# Patient Record
Sex: MALE | Race: WHITE | Employment: OTHER | URBAN - METROPOLITAN AREA
[De-identification: names, ages, dates, MRNs, and addresses within clinical notes are randomized per-mention and may not be internally consistent; named-entity substitution may affect disease eponyms.]

---

## 2017-01-01 ENCOUNTER — CLINICAL SUPPORT (OUTPATIENT)
Dept: CARDIOLOGY CLINIC | Age: 82
End: 2017-01-01

## 2017-01-01 DIAGNOSIS — I48.91 ATRIAL FIBRILLATION, UNSPECIFIED TYPE (HCC): ICD-10-CM

## 2017-01-01 DIAGNOSIS — Z79.01 LONG-TERM (CURRENT) USE OF ANTICOAGULANTS: Primary | ICD-10-CM

## 2017-01-01 LAB
INR BLD: 2.3
INR, EXTERNAL: 2.3 (ref 2–3)
PT POC: 27.9 SECONDS
VALID INTERNAL CONTROL?: YES

## 2017-01-12 ENCOUNTER — CLINICAL SUPPORT (OUTPATIENT)
Dept: CARDIOLOGY CLINIC | Age: 82
End: 2017-01-12

## 2017-01-12 DIAGNOSIS — I48.91 ATRIAL FIBRILLATION, UNSPECIFIED TYPE (HCC): ICD-10-CM

## 2017-01-12 DIAGNOSIS — Z79.01 LONG TERM (CURRENT) USE OF ANTICOAGULANTS: Primary | ICD-10-CM

## 2017-01-12 LAB
INR BLD: 2
PT POC: 21.2 SEC
VALID INTERNAL CONTROL?: YES

## 2017-01-14 ENCOUNTER — HOSPITAL ENCOUNTER (EMERGENCY)
Age: 82
Discharge: HOME | End: 2017-01-14
Attending: EMERGENCY MEDICINE
Payer: MEDICARE

## 2017-01-14 VITALS
HEIGHT: 71 IN | HEART RATE: 59 BPM | SYSTOLIC BLOOD PRESSURE: 105 MMHG | WEIGHT: 160 LBS | TEMPERATURE: 97 F | DIASTOLIC BLOOD PRESSURE: 63 MMHG | OXYGEN SATURATION: 96 % | RESPIRATION RATE: 19 BRPM | BODY MASS INDEX: 22.4 KG/M2

## 2017-01-14 DIAGNOSIS — R55 SYNCOPE, UNSPECIFIED SYNCOPE TYPE: Primary | ICD-10-CM

## 2017-01-14 LAB
ANION GAP BLD CALC-SCNC: 7 MMOL/L (ref 5–15)
APPEARANCE UR: CLEAR
APTT PPP: 28.9 SEC (ref 22.1–32.5)
BACTERIA URNS QL MICRO: NEGATIVE /HPF
BASOPHILS # BLD AUTO: 0 K/UL (ref 0–0.1)
BASOPHILS # BLD: 0 % (ref 0–1)
BILIRUB UR QL: NEGATIVE
BUN SERPL-MCNC: 22 MG/DL (ref 6–20)
BUN/CREAT SERPL: 20 (ref 12–20)
CALCIUM SERPL-MCNC: 8.5 MG/DL (ref 8.5–10.1)
CHLORIDE SERPL-SCNC: 105 MMOL/L (ref 97–108)
CK SERPL-CCNC: 74 U/L (ref 39–308)
CO2 SERPL-SCNC: 26 MMOL/L (ref 21–32)
COLOR UR: ABNORMAL
CREAT SERPL-MCNC: 1.11 MG/DL (ref 0.7–1.3)
DIGOXIN SERPL-MCNC: 0.9 NG/ML (ref 0.9–2)
EOSINOPHIL # BLD: 0.1 K/UL (ref 0–0.4)
EOSINOPHIL NFR BLD: 1 % (ref 0–7)
EPITH CASTS URNS QL MICRO: ABNORMAL /LPF
ERYTHROCYTE [DISTWIDTH] IN BLOOD BY AUTOMATED COUNT: 14.5 % (ref 11.5–14.5)
GLUCOSE SERPL-MCNC: 109 MG/DL (ref 65–100)
GLUCOSE UR STRIP.AUTO-MCNC: NEGATIVE MG/DL
HCT VFR BLD AUTO: 40.5 % (ref 36.6–50.3)
HGB BLD-MCNC: 13.1 G/DL (ref 12.1–17)
HGB UR QL STRIP: ABNORMAL
HYALINE CASTS URNS QL MICRO: ABNORMAL /LPF (ref 0–5)
INR PPP: 1.1 (ref 0.9–1.1)
KETONES UR QL STRIP.AUTO: NEGATIVE MG/DL
LEUKOCYTE ESTERASE UR QL STRIP.AUTO: NEGATIVE
LYMPHOCYTES # BLD AUTO: 14 % (ref 12–49)
LYMPHOCYTES # BLD: 1.1 K/UL (ref 0.8–3.5)
MAGNESIUM SERPL-MCNC: 2 MG/DL (ref 1.6–2.4)
MCH RBC QN AUTO: 29.4 PG (ref 26–34)
MCHC RBC AUTO-ENTMCNC: 32.3 G/DL (ref 30–36.5)
MCV RBC AUTO: 91 FL (ref 80–99)
MONOCYTES # BLD: 0.6 K/UL (ref 0–1)
MONOCYTES NFR BLD AUTO: 7 % (ref 5–13)
NEUTS SEG # BLD: 6 K/UL (ref 1.8–8)
NEUTS SEG NFR BLD AUTO: 78 % (ref 32–75)
NITRITE UR QL STRIP.AUTO: NEGATIVE
PH UR STRIP: 5.5 [PH] (ref 5–8)
PLATELET # BLD AUTO: 227 K/UL (ref 150–400)
POTASSIUM SERPL-SCNC: 4.2 MMOL/L (ref 3.5–5.1)
PROT UR STRIP-MCNC: NEGATIVE MG/DL
PROTHROMBIN TIME: 11.6 SEC (ref 9–11.1)
RBC # BLD AUTO: 4.45 M/UL (ref 4.1–5.7)
RBC #/AREA URNS HPF: ABNORMAL /HPF (ref 0–5)
SODIUM SERPL-SCNC: 138 MMOL/L (ref 136–145)
SP GR UR REFRACTOMETRY: 1.02 (ref 1–1.03)
THERAPEUTIC RANGE,PTTT: NORMAL SECS (ref 58–77)
TROPONIN I SERPL-MCNC: <0.04 NG/ML
UA: UC IF INDICATED,UAUC: ABNORMAL
UROBILINOGEN UR QL STRIP.AUTO: 0.2 EU/DL (ref 0.2–1)
WBC # BLD AUTO: 7.8 K/UL (ref 4.1–11.1)
WBC URNS QL MICRO: ABNORMAL /HPF (ref 0–4)

## 2017-01-14 PROCEDURE — 84484 ASSAY OF TROPONIN QUANT: CPT | Performed by: EMERGENCY MEDICINE

## 2017-01-14 PROCEDURE — 94762 N-INVAS EAR/PLS OXIMTRY CONT: CPT

## 2017-01-14 PROCEDURE — 96361 HYDRATE IV INFUSION ADD-ON: CPT

## 2017-01-14 PROCEDURE — 93005 ELECTROCARDIOGRAM TRACING: CPT

## 2017-01-14 PROCEDURE — 74011250636 HC RX REV CODE- 250/636: Performed by: EMERGENCY MEDICINE

## 2017-01-14 PROCEDURE — 80048 BASIC METABOLIC PNL TOTAL CA: CPT | Performed by: EMERGENCY MEDICINE

## 2017-01-14 PROCEDURE — 81001 URINALYSIS AUTO W/SCOPE: CPT | Performed by: EMERGENCY MEDICINE

## 2017-01-14 PROCEDURE — 36415 COLL VENOUS BLD VENIPUNCTURE: CPT | Performed by: EMERGENCY MEDICINE

## 2017-01-14 PROCEDURE — 82550 ASSAY OF CK (CPK): CPT | Performed by: EMERGENCY MEDICINE

## 2017-01-14 PROCEDURE — 80162 ASSAY OF DIGOXIN TOTAL: CPT | Performed by: EMERGENCY MEDICINE

## 2017-01-14 PROCEDURE — 85730 THROMBOPLASTIN TIME PARTIAL: CPT | Performed by: EMERGENCY MEDICINE

## 2017-01-14 PROCEDURE — 99285 EMERGENCY DEPT VISIT HI MDM: CPT

## 2017-01-14 PROCEDURE — 83735 ASSAY OF MAGNESIUM: CPT | Performed by: EMERGENCY MEDICINE

## 2017-01-14 PROCEDURE — 85025 COMPLETE CBC W/AUTO DIFF WBC: CPT | Performed by: EMERGENCY MEDICINE

## 2017-01-14 PROCEDURE — 85610 PROTHROMBIN TIME: CPT | Performed by: EMERGENCY MEDICINE

## 2017-01-14 PROCEDURE — 96360 HYDRATION IV INFUSION INIT: CPT

## 2017-01-14 RX ADMIN — SODIUM CHLORIDE 1000 ML: 900 INJECTION, SOLUTION INTRAVENOUS at 12:28

## 2017-01-14 NOTE — ED TRIAGE NOTES
Pt reports he was sitting getting his hair cut this morning, pt became dizzy and had a syncopal episode, pt denies LOC, wife reports + LOC, pt having hip pain from the fall

## 2017-01-14 NOTE — ED NOTES
I have reviewed discharge instructions. Paperwork given by provider and reviewed with patient; opportunity for questions given. Pt confirmed understanding of discharge instructions and of need for follow up with primary care provider. Pt left walking . Pt left with all personal belongings. Pt family/friends are present. Pt states he/she is not driving. Pt is alert and oriented x 4. Pt is medically stable and is in no current distress. Chief complaint was addressed and has improved.

## 2017-01-14 NOTE — DISCHARGE INSTRUCTIONS
We hope that we have addressed all of your medical concerns. The examination and treatment you received in the Emergency Department were for an emergent problem and were not intended as complete care. It is important that you follow up with your healthcare provider(s) for ongoing care. If your symptoms worsen or do not improve as expected, and you are unable to reach your usual health care provider(s), you should return to the Emergency Department. Today's healthcare is undergoing tremendous change, and patient satisfaction surveys are one of the many tools to assess the quality of medical care. You may receive a survey from the [employer] regarding your experience in the Emergency Department. I hope that your experience has been completely positive, particularly the medical care that I provided. As such, please participate in the survey; anything less than excellent does not meet my expectations or intentions. [facility] and [facility] participate in nationally recognized quality of care measures. If your blood pressure is greater than 120/80, as reported below, we urge that you seek medical care to address the potential of high blood pressure, commonly known as hypertension. Hypertension can be hereditary or can be caused by certain medical conditions, pain, stress, or \"white coat syndrome. \"       Please make an appointment with your health care provider(s) for follow up of your Emergency Department visit. VITALS:   Patient Vitals for the past 8 hrs:   Temp Pulse Resp BP SpO2   01/14/17 1300 - (!) 57 19 123/78 -   01/14/17 1230 - 66 20 140/89 99 %   01/14/17 1215 - 61 19 (!) 154/96 100 %   01/14/17 1207 97 °F (36.1 °C) 63 - (!) 144/94 100 %          Thank you for allowing us to provide you with medical care today. We realize that you have many choices for your emergency care needs.   Please choose us in the future for any continued health care needs. Mayra Zavaleta MD    [address] [facility].   Office: [phone]            Recent Results (from the past 24 hour(s))   EKG, 12 LEAD, INITIAL    Collection Time: 01/14/17 12:11 PM   Result Value Ref Range    Ventricular Rate 82 BPM    Atrial Rate 70 BPM    QRS Duration 88 ms    Q-T Interval 412 ms    QTC Calculation (Bezet) 481 ms    Calculated R Axis -41 degrees    Calculated T Axis -103 degrees    Diagnosis       Accelerated Junctional rhythm with frequent premature ventricular complexes  Left axis deviation  Nonspecific ST and T wave abnormality  Prolonged QT  Abnormal ECG  No previous ECGs available     CBC WITH AUTOMATED DIFF    Collection Time: 01/14/17 12:22 PM   Result Value Ref Range    WBC 7.8 4.1 - 11.1 K/uL    RBC 4.45 4.10 - 5.70 M/uL    HGB 13.1 12.1 - 17.0 g/dL    HCT 40.5 36.6 - 50.3 %    MCV 91.0 80.0 - 99.0 FL    MCH 29.4 26.0 - 34.0 PG    MCHC 32.3 30.0 - 36.5 g/dL    RDW 14.5 11.5 - 14.5 %    PLATELET 645 240 - 678 K/uL    NEUTROPHILS 78 (H) 32 - 75 %    LYMPHOCYTES 14 12 - 49 %    MONOCYTES 7 5 - 13 %    EOSINOPHILS 1 0 - 7 %    BASOPHILS 0 0 - 1 %    ABS. NEUTROPHILS 6.0 1.8 - 8.0 K/UL    ABS. LYMPHOCYTES 1.1 0.8 - 3.5 K/UL    ABS. MONOCYTES 0.6 0.0 - 1.0 K/UL    ABS. EOSINOPHILS 0.1 0.0 - 0.4 K/UL    ABS.  BASOPHILS 0.0 0.0 - 0.1 K/UL   METABOLIC PANEL, BASIC    Collection Time: 01/14/17 12:22 PM   Result Value Ref Range    Sodium 138 136 - 145 mmol/L    Potassium 4.2 3.5 - 5.1 mmol/L    Chloride 105 97 - 108 mmol/L    CO2 26 21 - 32 mmol/L    Anion gap 7 5 - 15 mmol/L    Glucose 109 (H) 65 - 100 mg/dL    BUN 22 (H) 6 - 20 MG/DL    Creatinine 1.11 0.70 - 1.30 MG/DL    BUN/Creatinine ratio 20 12 - 20      GFR est AA >60 >60 ml/min/1.73m2    GFR est non-AA >60 >60 ml/min/1.73m2    Calcium 8.5 8.5 - 10.1 MG/DL   CK W/ REFLX CKMB    Collection Time: 01/14/17 12:22 PM   Result Value Ref Range    CK 74 39 - 308 U/L   TROPONIN I Collection Time: 01/14/17 12:22 PM   Result Value Ref Range    Troponin-I, Qt. <0.04 <0.05 ng/mL   URINALYSIS W/ REFLEX CULTURE    Collection Time: 01/14/17 12:22 PM   Result Value Ref Range    Color YELLOW/STRAW      Appearance CLEAR CLEAR      Specific gravity 1.022 1.003 - 1.030      pH (UA) 5.5 5.0 - 8.0      Protein NEGATIVE  NEG mg/dL    Glucose NEGATIVE  NEG mg/dL    Ketone NEGATIVE  NEG mg/dL    Bilirubin NEGATIVE  NEG      Blood LARGE (A) NEG      Urobilinogen 0.2 0.2 - 1.0 EU/dL    Nitrites NEGATIVE  NEG      Leukocyte Esterase NEGATIVE  NEG      WBC 0-4 0 - 4 /hpf    RBC 10-20 0 - 5 /hpf    Epithelial cells FEW FEW /lpf    Bacteria NEGATIVE  NEG /hpf    UA:UC IF INDICATED CULTURE NOT INDICATED BY UA RESULT CNI      Hyaline Cast 2-5 0 - 5 /lpf   MAGNESIUM    Collection Time: 01/14/17 12:22 PM   Result Value Ref Range    Magnesium 2.0 1.6 - 2.4 mg/dL   PROTHROMBIN TIME + INR    Collection Time: 01/14/17 12:22 PM   Result Value Ref Range    INR 1.1 0.9 - 1.1      Prothrombin time 11.6 (H) 9.0 - 11.1 sec   PTT    Collection Time: 01/14/17 12:22 PM   Result Value Ref Range    aPTT 28.9 22.1 - 32.5 sec    aPTT, therapeutic range     58.0 - 77.0 SECS   DIGOXIN    Collection Time: 01/14/17 12:22 PM   Result Value Ref Range    DIGOXIN 0.9 0.90 - 2.00 NG/ML   EKG, 12 LEAD, INITIAL    Collection Time: 01/14/17  1:14 PM   Result Value Ref Range    Ventricular Rate 55 BPM    Atrial Rate 55 BPM    P-R Interval 216 ms    QRS Duration 94 ms    Q-T Interval 426 ms    QTC Calculation (Bezet) 407 ms    Calculated P Axis 1 degrees    Calculated R Axis -35 degrees    Calculated T Axis 8 degrees    Diagnosis       Sinus bradycardia with 1st degree AV block  Left axis deviation  Abnormal ECG  When compared with ECG of 14-JAN-2017 12:11,  MANUAL COMPARISON REQUIRED, DATA IS UNCONFIRMED         No results found.          Fainting: Care Instructions  Your Care Instructions    When you faint, or pass out, you lose consciousness for a short time. A brief drop in blood flow to the brain often causes it. When you fall or lie down, more blood flows to your brain and you regain consciousness. Emotional stress, pain, or overheating--especially if you have been standing--can make you faint. In these cases, fainting is usually not serious. But fainting can be a sign of a more serious problem. Your doctor may want you to have more tests to rule out other causes. The treatment you need depends on the reason why you fainted. The doctor has checked you carefully, but problems can develop later. If you notice any problems or new symptoms, get medical treatment right away. Follow-up care is a key part of your treatment and safety. Be sure to make and go to all appointments, and call your doctor if you are having problems. It's also a good idea to know your test results and keep a list of the medicines you take. How can you care for yourself at home? · Drink plenty of fluids to prevent dehydration. If you have kidney, heart, or liver disease and have to limit fluids, talk with your doctor before you increase your fluid intake. When should you call for help? Call 911 anytime you think you may need emergency care. For example, call if:  · You have symptoms of a heart problem. These may include:  ¨ Chest pain or pressure. ¨ Severe trouble breathing. ¨ A fast or irregular heartbeat. ¨ Lightheadedness or sudden weakness. ¨ Coughing up pink, foamy mucus. ¨ Passing out. After you call 911, the  may tell you to chew 1 adult-strength or 2 to 4 low-dose aspirin. Wait for an ambulance. Do not try to drive yourself. · You have symptoms of a stroke. These may include:  ¨ Sudden numbness, tingling, weakness, or loss of movement in your face, arm, or leg, especially on only one side of your body. ¨ Sudden vision changes. ¨ Sudden trouble speaking. ¨ Sudden confusion or trouble understanding simple statements.   ¨ Sudden problems with walking or balance. ¨ A sudden, severe headache that is different from past headaches. · You passed out (lost consciousness) again. Watch closely for changes in your health, and be sure to contact your doctor if:  · You do not get better as expected. Where can you learn more? Go to http://maite-miguel.info/. Enter P333 in the search box to learn more about \"Fainting: Care Instructions. \"  Current as of: May 27, 2016  Content Version: 11.1  © 3920-7419 [employer]. Care instructions adapted under license by [employer] (which disclaims liability or warranty for this information). If you have questions about a medical condition or this instruction, always ask your healthcare professional. [address] any warranty or liability for your use of this information.

## 2017-01-14 NOTE — ED PROVIDER NOTES
HPI Comments: 80 y.o. male with past medical history significant for A-fib, mitral valve disorder, GI AVM, and paresthesia who presents from home with chief complaint of syncope. Pt reports he was sitting on a stool having his hair trimmed when he \"became very dizzy\" and per Pt's wife, had a syncopal episode earlier this morning. Pt c/o 3/10 R hip pain. Pt denies previous hx of MI. Pt denies SOB, nausea, and vomiting. There are no other acute medical concerns at this time. Old Chart Review: Pt has PAF, takes digoxin and coumadin, and sees Dr. Shay Leyva for cardiology. EKG from 2 months ago shows normal sinus rhythm. PCP: Kelly Diaz MD    Note written by Harry Sotelo, as dictated by Martha Clifford MD 12:27 PM    The history is provided by the patient and the spouse. Past Medical History:   Diagnosis Date    Atrial fibrillation ([address]     Decreased hearing 8/28/2013    Disorder of skin 8/28/2013    GI AVM (gastrointestinal arteriovenous vascular malformation) 2005     Dr. Addis Sanchez Long-term (current) use of anticoagulants 8/28/2013    Mitral valve disorders     Mixed hyperlipidemia     Nonspecific (abnormal) findings on radiological and other examination of other intrathoracic organs 8/28/2013     Lung    Orthostatic hypotension     ELIANE (obstructive sleep apnea) 2010     Dr. Kimberlyn Lopez Osteopenia 8/28/2013    Paresthesia 8/28/2013    Personal history of malignant melanoma of skin 8/28/2013    Skin lesion 8/28/2013       History reviewed. No pertinent past surgical history. History reviewed. No pertinent family history. Social History     Social History    Marital status:      Spouse name: N/A    Number of children: N/A    Years of education: N/A     Occupational History    Not on file.      Social History Main Topics    Smoking status: Never Smoker    Smokeless tobacco: Not on file    Alcohol use No    Drug use: Not on file    Sexual activity: Not on file     Other Topics Concern    Not on file     Social History Narrative         ALLERGIES: Cortisone and Plavix [clopidogrel]    Review of Systems   Constitutional: Negative for activity change and fever. HENT: Negative for congestion. Eyes: Negative for pain. Respiratory: Negative for cough and shortness of breath. Cardiovascular: Negative for chest pain and leg swelling. Gastrointestinal: Negative for abdominal pain. Endocrine: Negative for polyuria. Genitourinary: Negative for flank pain and hematuria. Musculoskeletal: Positive for arthralgias. Negative for gait problem, neck pain and neck stiffness. Skin: Negative for color change. Allergic/Immunologic: Negative for immunocompromised state. Neurological: Positive for syncope. Negative for speech difficulty and headaches. Hematological: Does not bruise/bleed easily. Psychiatric/Behavioral: Negative for confusion. All other systems reviewed and are negative. Vitals:    01/14/17 1207   BP: (!) 144/94   Pulse: 63   Temp: 97 °F (36.1 °C)   SpO2: 100%   Weight: 72.6 kg (160 lb)   Height: 5' 11\" (1.803 m)            Physical Exam   Constitutional: He is oriented to person, place, and time. He appears well-developed and well-nourished. No distress. Elderly, frail appearing. In no distress. HENT:   Head: Normocephalic and atraumatic. Right Ear: External ear normal.   Left Ear: External ear normal.   Eyes: EOM are normal. Pupils are equal, round, and reactive to light. Neck: Normal range of motion. Neck supple. No JVD present. No tracheal deviation present. Cardiovascular: Normal rate, regular rhythm and normal heart sounds. Exam reveals no gallop and no friction rub. No murmur heard. Pulmonary/Chest: Effort normal and breath sounds normal. No stridor. No respiratory distress. He has no wheezes. He has no rales. Abdominal: Soft. Bowel sounds are normal. He exhibits no distension. There is no tenderness.  There is no rebound and no guarding. Musculoskeletal: Normal range of motion. He exhibits no edema or tenderness. No pain with ROM or movement of bilateral hips. Neurological: He is alert and oriented to person, place, and time. He has normal reflexes. No cranial nerve deficit. Coordination normal.   5/5 hand  bilaterally. 5/5 plantar flexion of feet bilaterally. Skin: Skin is warm and dry. No rash noted. He is not diaphoretic. No erythema. Psychiatric: He has a normal mood and affect. His behavior is normal. Judgment and thought content normal.   Nursing note and vitals reviewed. Note written by Harry Pearson, as dictated by Ramona Smith MD 12:27 PM    MDM  Number of Diagnoses or Management Options  Diagnosis management comments: 80-year-old white male returns to the emergency department with syncope. Patient was feeling fine earlier today. He was getting his hair cut and felt very dizzy. He then passed out. Patient is now feeling fine. He has a history of atrial fibrillation and is on digoxin. We'll check basic blood work. We'll give IV fluids. We'll reassess when testing his back. Patient agrees with this plan.        Amount and/or Complexity of Data Reviewed  Clinical lab tests: ordered and reviewed  Tests in the radiology section of CPT®: ordered and reviewed  Tests in the medicine section of CPT®: ordered and reviewed  Discussion of test results with the performing providers: yes  Decide to obtain previous medical records or to obtain history from someone other than the patient: yes  Obtain history from someone other than the patient: yes  Review and summarize past medical records: yes  Discuss the patient with other providers: yes  Independent visualization of images, tracings, or specimens: yes    Risk of Complications, Morbidity, and/or Mortality  Presenting problems: high  Diagnostic procedures: high  Management options: high      ED Course       Procedures    ED EKG interpretation:  Rhythm: likely A-fib; Rate (approx.): 82; Axis: left axis deviation; ST/T wave: no ST elevation or depressions; very wavy baseline, poor quality EKG. Note written by Harry Sotelo, as dictated by Martha Clifford MD 12:18 PM    PROGRESS NOTE:  12:27 PM  P waves are present before QRS on cardiac monitor. Rhythm appears to be sinus. ED EKG interpretation:  Rhythm: sinus bradycardia with 1st degree AV block; Rate: 55; Axis: left axis deviation; ST/T wave: no ST elevations or depressions; Note written by Harry Sotelo, as dictated by Martha Clifford MD 1:19 PM    CONSULT NOTE:  1:34 PM Martha Clifford MD spoke with Dr. Shay Leyva, Consult for Cardiology. Discussed available diagnostic tests and clinical findings. He is in agreement with care plans as outlined. Dr. Shay Leyva recommends that Pt does not require admission. PROGRESS NOTE:  1:35 PM  Disc w/ Pt about admission. He agrees and does not want to be admitted. Pt feels great. He agrees w/ discharge. Good return precautions given to patient. Close follow up with PCP recommended. Patient and/or family voices understanding of this plan. Discharge instructions were explained by me and all concerns were addressed.

## 2017-01-16 LAB
ATRIAL RATE: 55 BPM
ATRIAL RATE: 70 BPM
CALCULATED P AXIS, ECG09: 1 DEGREES
CALCULATED R AXIS, ECG10: -35 DEGREES
CALCULATED R AXIS, ECG10: -41 DEGREES
CALCULATED T AXIS, ECG11: -103 DEGREES
CALCULATED T AXIS, ECG11: 8 DEGREES
DIAGNOSIS, 93000: NORMAL
DIAGNOSIS, 93000: NORMAL
P-R INTERVAL, ECG05: 216 MS
Q-T INTERVAL, ECG07: 412 MS
Q-T INTERVAL, ECG07: 426 MS
QRS DURATION, ECG06: 88 MS
QRS DURATION, ECG06: 94 MS
QTC CALCULATION (BEZET), ECG08: 407 MS
QTC CALCULATION (BEZET), ECG08: 481 MS
VENTRICULAR RATE, ECG03: 55 BPM
VENTRICULAR RATE, ECG03: 82 BPM

## 2017-01-19 ENCOUNTER — TELEPHONE (OUTPATIENT)
Dept: CARDIOLOGY CLINIC | Age: 82
End: 2017-01-19

## 2017-01-19 NOTE — TELEPHONE ENCOUNTER
The patient requested a call back to discuss a possible hospital follow up. He was unsure if he really needed to be seen. He can be reached on [phone]. Thanks!

## 2017-02-17 ENCOUNTER — CLINICAL SUPPORT (OUTPATIENT)
Dept: CARDIOLOGY CLINIC | Age: 82
End: 2017-02-17

## 2017-02-17 DIAGNOSIS — I48.91 ATRIAL FIBRILLATION, UNSPECIFIED TYPE (HCC): ICD-10-CM

## 2017-02-17 DIAGNOSIS — Z79.01 LONG TERM (CURRENT) USE OF ANTICOAGULANTS: Primary | ICD-10-CM

## 2017-02-17 LAB
INR BLD: 2.5
PT POC: 30.1 SEC
VALID INTERNAL CONTROL?: YES

## 2017-03-17 ENCOUNTER — CLINICAL SUPPORT (OUTPATIENT)
Dept: CARDIOLOGY CLINIC | Age: 82
End: 2017-03-17

## 2017-03-17 DIAGNOSIS — Z79.01 LONG TERM (CURRENT) USE OF ANTICOAGULANTS: Primary | ICD-10-CM

## 2017-03-17 DIAGNOSIS — I48.91 ATRIAL FIBRILLATION, UNSPECIFIED TYPE (HCC): ICD-10-CM

## 2017-03-17 LAB
INR BLD: 2.2
PT POC: 26.7 SEC
VALID INTERNAL CONTROL?: YES

## 2017-04-14 ENCOUNTER — CLINICAL SUPPORT (OUTPATIENT)
Dept: CARDIOLOGY CLINIC | Age: 82
End: 2017-04-14

## 2017-04-14 DIAGNOSIS — I48.91 ATRIAL FIBRILLATION, UNSPECIFIED TYPE (HCC): ICD-10-CM

## 2017-04-14 DIAGNOSIS — Z79.01 LONG TERM (CURRENT) USE OF ANTICOAGULANTS: Primary | ICD-10-CM

## 2017-04-14 LAB
INR BLD: 2
PT POC: 22.3 SEC
VALID INTERNAL CONTROL?: YES

## 2017-05-16 ENCOUNTER — CLINICAL SUPPORT (OUTPATIENT)
Dept: CARDIOLOGY CLINIC | Age: 82
End: 2017-05-16

## 2017-05-16 ENCOUNTER — OFFICE VISIT (OUTPATIENT)
Dept: CARDIOLOGY CLINIC | Age: 82
End: 2017-05-16

## 2017-05-16 VITALS
WEIGHT: 160 LBS | RESPIRATION RATE: 20 BRPM | DIASTOLIC BLOOD PRESSURE: 84 MMHG | HEIGHT: 71 IN | HEART RATE: 59 BPM | BODY MASS INDEX: 22.4 KG/M2 | OXYGEN SATURATION: 97 % | SYSTOLIC BLOOD PRESSURE: 128 MMHG

## 2017-05-16 DIAGNOSIS — I48.91 ATRIAL FIBRILLATION, UNSPECIFIED TYPE (HCC): Primary | ICD-10-CM

## 2017-05-16 DIAGNOSIS — E78.5 DYSLIPIDEMIA: ICD-10-CM

## 2017-05-16 DIAGNOSIS — Z79.01 LONG TERM (CURRENT) USE OF ANTICOAGULANTS: Primary | ICD-10-CM

## 2017-05-16 DIAGNOSIS — I48.91 ATRIAL FIBRILLATION, UNSPECIFIED TYPE (HCC): ICD-10-CM

## 2017-05-16 LAB
INR BLD: 2.5
INR, EXTERNAL: 2.5 (ref 2–3)
PT POC: 30.2 SEC
VALID INTERNAL CONTROL?: YES

## 2017-05-16 NOTE — PROGRESS NOTES
LAST OFFICE VISIT : 5/16/2017      No diagnosis found. Rukhsana Olivo is a 80 y.o. male with hypertension, hyperlipidemia, atrial fibrillation, orthostatic hypotension and mitral regurgitation referred for follow up. Cardiac risk factors: dyslipidemia, sedentary life style, male gender, hypertension. I have personally obtained the history from the patient. HISTORY OF PRESENTING ILLNESS      He reports becoming more easily fatigued with activity than in the past. He sleeps well at night. He denies any episodes of irregular heart rhythm although he does note occasional elevated heart beats when he is active. The patient denies chest pain/ shortness of breath, orthopnea, PND, LE edema, syncope, presyncope or fatigue.        ACTIVE PROBLEM LIST     Patient Active Problem List    Diagnosis Date Noted    Dyslipidemia 11/11/2015    Disorder of skin 08/28/2013    Skin lesion 08/28/2013    Decreased hearing 08/28/2013    Paresthesia 08/28/2013    Osteopenia 08/28/2013    Nonspecific (abnormal) findings on radiological and other examination of other intrathoracic organs 08/28/2013    Personal history of malignant melanoma of skin 08/28/2013    Long-term (current) use of anticoagulants 08/28/2013    Orthostatic hypotension 08/16/2012    A-fib ([address] 02/17/2012    Mitral valve disorder 02/17/2012           PAST MEDICAL HISTORY     Past Medical History:   Diagnosis Date    Atrial fibrillation ([address]     Decreased hearing 8/28/2013    Disorder of skin 8/28/2013    GI AVM (gastrointestinal arteriovenous vascular malformation) 2005    Dr. Tyler Schmidt Long-term (current) use of anticoagulants 8/28/2013    Mitral valve disorders     Mixed hyperlipidemia     Nonspecific (abnormal) findings on radiological and other examination of other intrathoracic organs 8/28/2013    Lung    Orthostatic hypotension     ELIANE (obstructive sleep apnea) 2010    Dr. Viktoria Ibrahim Osteopenia 8/28/2013    Paresthesia 8/28/2013    Personal history of malignant melanoma of skin 8/28/2013    Skin lesion 8/28/2013           PAST SURGICAL HISTORY     History reviewed. No pertinent surgical history. ALLERGIES     Allergies   Allergen Reactions    Cortisone Hives    Plavix [Clopidogrel] Unknown (comments)          FAMILY HISTORY     History reviewed. No pertinent family history. negative for cardiac disease       SOCIAL HISTORY     Social History     Social History    Marital status:      Spouse name: N/A    Number of children: N/A    Years of education: N/A     Social History Main Topics    Smoking status: Never Smoker    Smokeless tobacco: None    Alcohol use No    Drug use: None    Sexual activity: Not Asked     Other Topics Concern    None     Social History Narrative         MEDICATIONS     Current Outpatient Prescriptions   Medication Sig    cholecalciferol (VITAMIN D3) 1,000 unit tablet Take 1,000 Units by mouth Before breakfast, lunch, and dinner.  finasteride (PROSCAR) 5 mg tablet Take 5 mg by mouth daily.  Ferrous Fumarate 325 mg (106 mg iron) tab Take  by mouth.  warfarin (COUMADIN) 5 mg tablet Take 1 Tab by mouth daily. Or as directed by [facility]    digoxin (LANOXIN) 0.125 mg tablet Take 1 Tab by mouth daily.  metoprolol (LOPRESSOR) 25 mg tablet Take 12.5 mg by mouth two (2) times a day.  nitroglycerin (NITROSTAT) 0.4 mg SL tablet 1 Tab by SubLINGual route every five (5) minutes as needed for Chest Pain. No current facility-administered medications for this visit. I have reviewed the nurses notes, vitals, problem list, allergy list, medical history, family, social history and medications. REVIEW OF SYMPTOMS      General: Pt denies excessive weight gain or loss. Pt is able to conduct ADL's  HEENT: Denies blurred vision, headaches, hearing loss, epistaxis and difficulty swallowing.   Respiratory: Denies cough, congestion, shortness of breath, GASTELUM, wheezing or stridor. Cardiovascular: Denies precordial pain, palpitations, edema or PND  Gastrointestinal: Denies poor appetite, indigestion, abdominal pain or blood in stool  Genitourinary: Denies hematuria, dysuria, increased urinary frequency  Musculoskeletal: Denies joint pain or swelling from muscles or joints  Neurologic: Denies tremor, paresthesias, headache, or sensory motor disturbance  Psychiatric: Denies confusion, insomnia, depression  Integumentray: Denies rash, itching or ulcers. Hematologic: Denies easy bruising, bleeding     PHYSICAL EXAMINATION      Vitals:    05/16/17 1138   BP: 128/84   Pulse: (!) 59   Resp: 20   SpO2: 97%   Weight: 160 lb (72.6 kg)   Height: 5' 11\" (1.803 m)     General: Well developed, in no acute distress. HEENT: No jaundice, oral mucosa moist, no oral ulcers  Neck: Supple, no stiffness, no lymphadenopathy, supple  Heart: sinus bradycardia   Respiratory: Clear bilaterally x 4, no wheezing or rales  Abdomen:   Soft, non-tender, bowel sounds are active.   Extremities:  No edema, normal cap refill, no cyanosis. Musculoskeletal: No clubbing, no deformities  Neuro: A&Ox3, speech clear, gait stable, cooperative, no focal neurologic deficits  Skin: Skin color is normal. No rashes or lesions.  Non diaphoretic, moist.  Vascular: 2+ pulses symmetric in all extremities        EKG:      DIAGNOSTIC DATA     Cholesterol Profile  11/03/16- , HDL 63, ,      LABORATORY DATA            Lab Results   Component Value Date/Time    WBC 7.8 01/14/2017 12:22 PM    HGB 13.1 01/14/2017 12:22 PM    HCT 40.5 01/14/2017 12:22 PM    PLATELET 936 55/37/8217 12:22 PM    MCV 91.0 01/14/2017 12:22 PM      Lab Results   Component Value Date/Time    Sodium 138 01/14/2017 12:22 PM    Potassium 4.2 01/14/2017 12:22 PM    Chloride 105 01/14/2017 12:22 PM    CO2 26 01/14/2017 12:22 PM    Anion gap 7 01/14/2017 12:22 PM    Glucose 109 01/14/2017 12:22 PM    BUN 22 01/14/2017 12:22 PM    Creatinine 1.11 01/14/2017 12:22 PM    BUN/Creatinine ratio 20 01/14/2017 12:22 PM    GFR est AA >60 01/14/2017 12:22 PM    GFR est non-AA >60 01/14/2017 12:22 PM    Calcium 8.5 01/14/2017 12:22 PM           ASSESSMENT/RECOMMENDATIONS:.      1. PAF  -today his rhythm is just slow and he states that he feels his heart rate go fast particularly when he using push   -otherewise mild fatigue but no shortness of breath   -have him return in 2 weeks for EKG and orthostatics to be checked     2. MR  -will continue to follow with echos but at this time I will not check one     3. Orthostatic hypotension  -again encouraged to increase fluid intake and wear compression hose     4. Anticoagulation   -no bleeding issues with this     5. Follow up in 6 months or PRN    No orders of the defined types were placed in this encounter. Follow-up Disposition: Not on File      I have discussed the diagnosis with  Gerardo Bateman and the intended plan as seen in the above orders. Questions were answered concerning future plans. I have discussed medication side effects and warnings with the patient as well. Thank you,  Eleni Garcia MD for involving me in the care of  Gerardo Bateman. Please do not hesitate to contact me for further questions/concerns. This note was written by priscila Mendoza, as dictated by Mary Lee MD.      Ezequiel Buitrago. MD [place], [address] [place] [address] Rd[address]      [facility], [address] [place], [place] [address]jonathan Jeniseksjuan pabloFlagstaff Medical Center [facility]      [phone] / [phone] Fax

## 2017-05-16 NOTE — PROGRESS NOTES
Visit Vitals    /84 (BP 1 Location: Left arm, BP Patient Position: Sitting)    Pulse (!) 59    Resp 20    Ht 5' 11\" (1.803 m)    Wt 160 lb (72.6 kg)    SpO2 97%    BMI 22.32 kg/m2     Chief Complaint   Patient presents with    Dizziness     No refills, No complaints     Extended / Orthostatic Vitals:  Patient Position 2: Supine  BP 2: 118/64  Pulse 2: 58  Patient Position 3: Standing  BP 3: 100/78

## 2017-05-16 NOTE — MR AVS SNAPSHOT
Visit Information Date & Time Provider Department Dept. Phone Encounter #  
 5/16/2017 11:40 AM Kedar Garcia MD [facility] Cheryl Stone [phone] [id number] Upcoming Health Maintenance Date Due ZOSTER VACCINE AGE 60> 4/11/1986 GLAUCOMA SCREENING Q2Y 4/11/1991 Pneumococcal 65+ Low/Medium Risk (1 of 2 - PCV13) 4/11/1991 MEDICARE YEARLY EXAM 4/11/1991 DTaP/Tdap/Td series (1 - Tdap) 6/2/2005 INFLUENZA AGE 9 TO ADULT 8/1/2017 Allergies as of 5/16/2017  Review Complete On: 5/16/2017 By: Kedar Garcia MD  
  
 Severity Noted Reaction Type Reactions Cortisone  10/29/2010    Hives Plavix [Clopidogrel]  08/28/2013    Unknown (comments) Current Immunizations  Never Reviewed Name Date Td 6/1/2005 Tetanus Toxoid 1/1/2002, 8/2/2001 Not reviewed this visit Vitals BP Pulse Resp Height(growth percentile) Weight(growth percentile) SpO2  
 128/84 (BP 1 Location: Left arm, BP Patient Position: Sitting) (!) 59 20 5' 11\" (1.803 m) 160 lb (72.6 kg) 97% BMI Smoking Status 22.32 kg/m2 Never Smoker Vitals History BMI and BSA Data Body Mass Index Body Surface Area  
 22.32 kg/m 2 1.91 m 2 Preferred Pharmacy Pharmacy Name Phone [facility] [address] [place] [address] [facility] [place] [phone] Your Updated Medication List  
  
   
This list is accurate as of: 5/16/17 11:55 AM.  Always use your most recent med list.  
  
  
  
  
 digoxin 0.125 mg tablet Commonly known as:  LANOXIN Take 1 Tab by mouth daily. Ferrous Fumarate 325 mg (106 mg iron) Tab Take  by mouth. finasteride 5 mg tablet Commonly known as:  PROSCAR Take 5 mg by mouth daily. metoprolol tartrate 25 mg tablet Commonly known as:  LOPRESSOR Take 12.5 mg by mouth two (2) times a day. nitroglycerin 0.4 mg SL tablet Commonly known as:  NITROSTAT 1 Tab by SubLINGual route every five (5) minutes as needed for Chest Pain. VITAMIN D3 1,000 unit tablet Generic drug:  cholecalciferol Take 1,000 Units by mouth Before breakfast, lunch, and dinner. warfarin 5 mg tablet Commonly known as:  COUMADIN Take 1 Tab by mouth daily. Or as directed by [facility] Introducing [facility] & HEALTH SERVICES! [employer] introduces vushaper patient portal. Now you can access parts of your medical record, email your doctor's office, and request medication refills online. 1. In your internet browser, go to [email or web address] 2. Click on the First Time User? Click Here link in the Sign In box. You will see the New Member Sign Up page. 3. Enter your vushaper Access Code exactly as it appears below. You will not need to use this code after youve completed the sign-up process. If you do not sign up before the expiration date, you must request a new code. · vushaper Access Code: [email or web address] Expires: 8/14/2017 11:55 AM 
 
4. Enter the last four digits of your Social Security Number (xxxx) and Date of Birth (mm/dd/yyyy) as indicated and click Submit. You will be taken to the next sign-up page. 5. Create a vushaper ID. This will be your vushaper login ID and cannot be changed, so think of one that is secure and easy to remember. 6. Create a vushaper password. You can change your password at any time. 7. Enter your Password Reset Question and Answer. This can be used at a later time if you forget your password. 8. Enter your e-mail address. You will receive e-mail notification when new information is available in [address] 9. Click Sign Up. You can now view and download portions of your medical record. 10. Click the Download Summary menu link to download a portable copy of your medical information.  
 
If you have questions, please visit the Frequently Asked Questions section of the [employer]. Remember, [employer] is NOT to be used for urgent needs. For medical emergencies, dial 911. Now available from your iPhone and Android! Please provide this summary of care documentation to your next provider. Your primary care clinician is listed as Mykel Sanchez. If you have any questions after today's visit, please call [phone].

## 2017-05-22 ENCOUNTER — TELEPHONE (OUTPATIENT)
Dept: CARDIOLOGY CLINIC | Age: 82
End: 2017-05-22

## 2017-05-22 ENCOUNTER — CLINICAL SUPPORT (OUTPATIENT)
Dept: CARDIOLOGY CLINIC | Age: 82
End: 2017-05-22

## 2017-05-22 VITALS
SYSTOLIC BLOOD PRESSURE: 142 MMHG | DIASTOLIC BLOOD PRESSURE: 74 MMHG | HEART RATE: 72 BPM | RESPIRATION RATE: 20 BRPM | OXYGEN SATURATION: 97 % | BODY MASS INDEX: 22.46 KG/M2 | WEIGHT: 160.4 LBS | HEIGHT: 71 IN

## 2017-05-22 DIAGNOSIS — R00.1 BRADYCARDIA: Primary | ICD-10-CM

## 2017-05-22 NOTE — TELEPHONE ENCOUNTER
Keshia Andrea last week - was to come back in 2 weeks for EKG/orthos but is concerned about feeling bad with slow HR today. Have asked pt to go to [facility] office for EKG/BP today.

## 2017-05-22 NOTE — PROGRESS NOTES
Extended / Orthostatic Vitals:  Patient Position 2: Supine  BP 2: 142/74  Pulse 2: 72  Patient Position 3: Standing  BP 3: 122/80  Pulse 3: 84      Patient complaints of dizziness, shortness of breath and fatigue for the last 2-3 days. He is not taking Digoxin. He is not wearing his compression hose. States he drinks plenty of fluids but not 64 oz a day. He has been doing a lot work around [employer], he does all the cooking and grocery shopping. Advised patient that we would relay all information to Dr. Cheko Liu for him to review  and he can expect a call phone from Dr. Darlene Koenig nurse with any new orders.

## 2017-05-22 NOTE — TELEPHONE ENCOUNTER
Please review EKG and VS done at [facility] office today. He wants to know if he should restart his digoxin.  (not wearing compression hose)

## 2017-06-06 ENCOUNTER — CLINICAL SUPPORT (OUTPATIENT)
Dept: CARDIOLOGY CLINIC | Age: 82
End: 2017-06-06

## 2017-06-06 VITALS — SYSTOLIC BLOOD PRESSURE: 110 MMHG | DIASTOLIC BLOOD PRESSURE: 60 MMHG | HEART RATE: 60 BPM

## 2017-06-06 DIAGNOSIS — R00.1 BRADYCARDIA: Primary | ICD-10-CM

## 2017-06-06 DIAGNOSIS — I48.91 ATRIAL FIBRILLATION, UNSPECIFIED TYPE (HCC): ICD-10-CM

## 2017-06-06 RX ORDER — DIGOXIN 125 MCG
0.06 TABLET ORAL DAILY
COMMUNITY
End: 2017-07-27 | Stop reason: ALTCHOICE

## 2017-06-06 NOTE — PROGRESS NOTES
Pt had Digoxin stopped at last OV. Pt c/o more shortness of breath with activity. Discussed with Dr. Мария Martinez and he wants pt to restart Digoxin at 0.062. Pt to cut his tablets in half for this dosing.  Holter appt made and 6 week f/u with MD.

## 2017-06-20 ENCOUNTER — CLINICAL SUPPORT (OUTPATIENT)
Dept: CARDIOLOGY CLINIC | Age: 82
End: 2017-06-20

## 2017-06-20 DIAGNOSIS — R00.1 BRADYCARDIA: Primary | ICD-10-CM

## 2017-06-20 DIAGNOSIS — Z79.01 LONG-TERM (CURRENT) USE OF ANTICOAGULANTS: Primary | ICD-10-CM

## 2017-06-20 DIAGNOSIS — I48.91 ATRIAL FIBRILLATION, UNSPECIFIED TYPE (HCC): ICD-10-CM

## 2017-06-20 LAB
INR BLD: 3
INR, EXTERNAL: 3 (ref 2–3)
PT POC: 38.7 SECONDS
VALID INTERNAL CONTROL?: YES

## 2017-06-20 NOTE — PROGRESS NOTES
Applied holter monitor for pt per Dr Carmelina Lefort  Dx: palps. Pt has #[id number] & is due back on Wed 6/21/17.

## 2017-07-25 ENCOUNTER — CLINICAL SUPPORT (OUTPATIENT)
Dept: CARDIOLOGY CLINIC | Age: 82
End: 2017-07-25

## 2017-07-25 DIAGNOSIS — I48.91 ATRIAL FIBRILLATION, UNSPECIFIED TYPE (HCC): ICD-10-CM

## 2017-07-25 DIAGNOSIS — Z79.01 LONG-TERM (CURRENT) USE OF ANTICOAGULANTS: Primary | ICD-10-CM

## 2017-07-25 LAB
INR BLD: 2.6
INR, EXTERNAL: 2.6 (ref 2–3)
PT POC: 31 SECONDS
VALID INTERNAL CONTROL?: YES

## 2017-07-27 ENCOUNTER — OFFICE VISIT (OUTPATIENT)
Dept: CARDIOLOGY CLINIC | Age: 82
End: 2017-07-27

## 2017-07-27 VITALS
WEIGHT: 157.6 LBS | BODY MASS INDEX: 22.06 KG/M2 | OXYGEN SATURATION: 95 % | HEIGHT: 71 IN | DIASTOLIC BLOOD PRESSURE: 74 MMHG | HEART RATE: 60 BPM | RESPIRATION RATE: 20 BRPM | SYSTOLIC BLOOD PRESSURE: 124 MMHG

## 2017-07-27 DIAGNOSIS — I48.91 ATRIAL FIBRILLATION, UNSPECIFIED TYPE (HCC): Primary | ICD-10-CM

## 2017-07-27 DIAGNOSIS — E78.5 DYSLIPIDEMIA: ICD-10-CM

## 2017-07-27 DIAGNOSIS — R00.1 BRADYCARDIA: ICD-10-CM

## 2017-07-27 DIAGNOSIS — R42 DIZZINESS: ICD-10-CM

## 2017-07-27 RX ORDER — METOPROLOL TARTRATE 50 MG/1
25 TABLET ORAL 2 TIMES DAILY
COMMUNITY

## 2017-07-27 NOTE — PROGRESS NOTES
LAST OFFICE VISIT : 7/25/2017        ICD-10-CM ICD-9-CM   1. Atrial fibrillation, unspecified type ([facility] I48.91 427.31   2. Bradycardia R00.1 427.89   3. Dyslipidemia E78.5 272.4            Gregoria Jewell is a 80 y.o. male with HTN and hypercholesterolemia referred for      2 months ago. Cardiac risk factors: dyslipidemia, sedentary life style, male gender, hypertension  I have personally obtained the history from the patient. HISTORY OF PRESENTING ILLNESS      Mr. Jovana Rodriguez is doing well with no interval issues. He has stable pattern of GASTELUM with moderate activity. Denies any associated chest pain or dizziness. Although, he notes positional dizziness with quick turns. The patient denies chest pain, orthopnea, PND, LE edema, palpitations, syncope, presyncope or fatigue. He plans to get blood work next week with the [place].       ACTIVE PROBLEM LIST     Patient Active Problem List    Diagnosis Date Noted    Bradycardia 05/22/2017    Dyslipidemia 11/11/2015    Disorder of skin 08/28/2013    Skin lesion 08/28/2013    Decreased hearing 08/28/2013    Paresthesia 08/28/2013    Osteopenia 08/28/2013    Nonspecific (abnormal) findings on radiological and other examination of other intrathoracic organs 08/28/2013    Personal history of malignant melanoma of skin 08/28/2013    Long-term (current) use of anticoagulants 08/28/2013    Orthostatic hypotension 08/16/2012    A-fib ([facility] 02/17/2012    Mitral valve disorder 02/17/2012           PAST MEDICAL HISTORY     Past Medical History:   Diagnosis Date    Atrial fibrillation ([facility]     Decreased hearing 8/28/2013    Disorder of skin 8/28/2013    GI AVM (gastrointestinal arteriovenous vascular malformation) 2005    Dr. Garcia Thurston Long-term (current) use of anticoagulants 8/28/2013    Mitral valve disorders     Mixed hyperlipidemia     Nonspecific (abnormal) findings on radiological and other examination of other intrathoracic organs 8/28/2013    Lung  Orthostatic hypotension     ELIANE (obstructive sleep apnea) 2010    Dr. Lynn Steinberg Osteopenia 8/28/2013    Paresthesia 8/28/2013    Personal history of malignant melanoma of skin 8/28/2013    Skin lesion 8/28/2013           PAST SURGICAL HISTORY     History reviewed. No pertinent surgical history. ALLERGIES     Allergies   Allergen Reactions    Cortisone Hives    Plavix [Clopidogrel] Unknown (comments)          FAMILY HISTORY     History reviewed. No pertinent family history. negative for cardiac disease       SOCIAL HISTORY     Social History     Social History    Marital status:      Spouse name: N/A    Number of children: N/A    Years of education: N/A     Social History Main Topics    Smoking status: Never Smoker    Smokeless tobacco: Never Used    Alcohol use No    Drug use: None    Sexual activity: Not Asked     Other Topics Concern    None     Social History Narrative         MEDICATIONS     Current Outpatient Prescriptions   Medication Sig    metoprolol tartrate (LOPRESSOR) 50 mg tablet Take 25 mg by mouth two (2) times a day.  digoxin (LANOXIN) 0.125 mg tablet Take 0.062 mg by mouth daily.  cholecalciferol (VITAMIN D3) 1,000 unit tablet Take 1,000 Units by mouth Before breakfast, lunch, and dinner.  finasteride (PROSCAR) 5 mg tablet Take 5 mg by mouth daily.  Ferrous Fumarate 325 mg (106 mg iron) tab Take  by mouth two (2) times a week.  nitroglycerin (NITROSTAT) 0.4 mg SL tablet 1 Tab by SubLINGual route every five (5) minutes as needed for Chest Pain.  warfarin (COUMADIN) 5 mg tablet Take 1 Tab by mouth daily. Or as directed by Coumadin [facility]     No current facility-administered medications for this visit. I have reviewed the nurses notes, vitals, problem list, allergy list, medical history, family, social history and medications. REVIEW OF SYMPTOMS      General: Pt denies excessive weight gain or loss.  Pt is able to conduct ADL's  HEENT: Denies blurred vision, headaches, hearing loss, epistaxis and difficulty swallowing. Respiratory: Denies cough, congestion, shortness of breath, GASTELUM, wheezing or stridor. Cardiovascular: Denies precordial pain, palpitations, edema or PND  Gastrointestinal: Denies poor appetite, indigestion, abdominal pain or blood in stool  Genitourinary: Denies hematuria, dysuria, increased urinary frequency  Musculoskeletal: Denies joint pain or swelling from muscles or joints  Neurologic: Denies tremor, paresthesias, headache, or sensory motor disturbance  Psychiatric: Denies confusion, insomnia, depression  Integumentray: Denies rash, itching or ulcers. Hematologic: Denies easy bruising, bleeding     PHYSICAL EXAMINATION      Vitals:    07/27/17 1056   BP: 124/74   Pulse: 60   Resp: 20   SpO2: 95%   Weight: 157 lb 9.6 oz (71.5 kg)   Height: 5' 11\" (1.803 m)     General: Well developed, in no acute distress. HEENT: No jaundice, oral mucosa moist, no oral ulcers  Neck: Supple, no stiffness, no lymphadenopathy, supple  Heart:  Normal S1/S2 negative S3 or S4. Regular, no murmur, gallop or rub, no jugular venous distention  Respiratory: Clear bilaterally x 4, no wheezing or rales  Abdomen:   Soft, non-tender, bowel sounds are active.   Extremities:  No edema, normal cap refill, no cyanosis. Musculoskeletal: No clubbing, no deformities  Neuro: A&Ox3, speech clear, gait stable, cooperative, no focal neurologic deficits  Skin: Skin color is normal. No rashes or lesions. Non diaphoretic, moist.  Vascular: 2+ pulses symmetric in all extremities     DIAGNOSTIC DATA     1. Echo   10/11/10-EF 60%, DD, GIFTY, mild MR TR    2. Holter monitor   06/20/17- predominant rhythm, sinus bradycardia 1 st degree AV block HR varied from  bpm rare PVCs, 8.1%     3.  Cholesterol Profile  11/03/16- , HDL 63, ,        LABORATORY DATA            Lab Results   Component Value Date/Time    WBC 7.8 01/14/2017 12:22 PM    HGB 13.1 01/14/2017 12:22 PM    HCT 40.5 01/14/2017 12:22 PM    PLATELET 313 96/61/3716 12:22 PM    MCV 91.0 01/14/2017 12:22 PM      Lab Results   Component Value Date/Time    Sodium 138 01/14/2017 12:22 PM    Potassium 4.2 01/14/2017 12:22 PM    Chloride 105 01/14/2017 12:22 PM    CO2 26 01/14/2017 12:22 PM    Anion gap 7 01/14/2017 12:22 PM    Glucose 109 01/14/2017 12:22 PM    BUN 22 01/14/2017 12:22 PM    Creatinine 1.11 01/14/2017 12:22 PM    BUN/Creatinine ratio 20 01/14/2017 12:22 PM    GFR est AA >60 01/14/2017 12:22 PM    GFR est non-AA >60 01/14/2017 12:22 PM    Calcium 8.5 01/14/2017 12:22 PM           ASSESSMENT/RECOMMENDATIONS:.      1. PAF  -Denies any irregularities in rhythm today. Holter montior from06/20/17 showed 8% PVCs, no arrhythmias otherwise. Mostly he was a sinus bradycardia. Today I am stopping his DIgoxin and keeping him on Metoprolol 25mg BID. 2. MR  -seems relatively stable. No further testing given his age. 3. Orthostatic hypotension   -He continues to feel dizzy with quick positional changes, but denies any syncope or near syncope. -Recheck BP was 140/80   -Will stop Digoxin. Continue with other medications with no adjustments to antihypertensives. -Return in 2 weeks for EKG to look at rhythm off Digoxin. 4. Anticoagulation   -No bleeding issues on Coumadin. Will give him lab slip for CBC and BMP    5. Return in 6 months, sooner PRN    Orders Placed This Encounter    metoprolol tartrate (LOPRESSOR) 50 mg tablet     Sig: Take 25 mg by mouth two (2) times a day. Follow-up Disposition:  Return in about 6 months (around 1/27/2018). I have discussed the diagnosis with  Javier Ruiz and the intended plan as seen in the above orders. Questions were answered concerning future plans. I have discussed medication side effects and warnings with the patient as well. Thank you,  No primary care provider on file. for involving me in the care of  Javier Ruiz.  Please do not hesitate to contact me for further questions/concerns. Written by Dana Hernandez, as dictated by Praneeth Malik MD.    Angelica Esparza MD, [address]      [address]      [phone] / [phone] Fax

## 2017-07-27 NOTE — MR AVS SNAPSHOT
Visit Information Date & Time Provider Department Dept. Phone Encounter #  
 7/27/2017 11:00 AM Shital Flores MD [facility] Sheryle Pesa [phone] [id number] Follow-up Instructions Return in about 6 months (around 1/27/2018). Follow-up and Disposition History Your Appointments 8/10/2017  1:40 PM  
EKG with Shital Flores MD  
[facility] ([place] SCHEDULING) Appt Note: EKG PER DR Kyle [address] [facility] [address]  
[phone]  
  
   
 [address] [facility] [address]  
  
    
 8/30/2017  1:20 PM  
COUMADIN CLINIC with COUMGUNJAN CHAUDHARY  
[facility] ([address]) Appt Note: f/u sov$0 crf 07/25/17  
 [address] [facility]  
[phone]  
  
   
 [address] [facility] [address] [facility] [address]  
  
    
 11/8/2017  3:00 PM  
ESTABLISHED PATIENT with Shital Flores MD  
[facility] ([address]) Appt Note: 6 MO FUP  
 [address] [facility] [address]  
[phone]  
  
   
 [address] [facility] [address]  
  
    
 1/30/2018  1:20 PM  
ESTABLISHED PATIENT with Shital Flores MD  
[facility] ([place] SCHEDULING) Appt Note: 6 MO FUP  
 [address] [facility]  
[phone] Upcoming Health Maintenance Date Due ZOSTER VACCINE AGE 60> 2/11/1986 GLAUCOMA SCREENING Q2Y 4/11/1991 Pneumococcal 65+ Low/Medium Risk (1 of 2 - PCV13) 4/11/1991 MEDICARE YEARLY EXAM 4/11/1991 DTaP/Tdap/Td series (1 - Tdap) 6/2/2005 INFLUENZA AGE 9 TO ADULT 8/1/2017 Allergies as of 7/27/2017  Review Complete On: 7/27/2017 By: Shital Flores MD  
  
 Severity Noted Reaction Type Reactions Cortisone  10/29/2010    Hives Plavix [Clopidogrel]  08/28/2013    Unknown (comments) Current Immunizations  Never Reviewed Name Date Td 6/1/2005 Tetanus Toxoid 1/1/2002, 8/2/2001 Not reviewed this visit You Were Diagnosed With   
  
 Codes Comments Atrial fibrillation, unspecified type ([facility]    -  Primary ICD-10-CM: I48.91 
ICD-9-CM: 427.31 Bradycardia     ICD-10-CM: R00.1 ICD-9-CM: 427.89 Dyslipidemia     ICD-10-CM: E78.5 ICD-9-CM: 272.4 Dizziness     ICD-10-CM: T78 ICD-9-CM: 780.4 Vitals BP Pulse Resp Height(growth percentile) Weight(growth percentile) SpO2  
 124/74 (BP 1 Location: Left arm, BP Patient Position: Sitting) 60 20 5' 11\" (1.803 m) 157 lb 9.6 oz (71.5 kg) 95% BMI Smoking Status 21.98 kg/m2 Never Smoker Vitals History BMI and BSA Data Body Mass Index Body Surface Area  
 21.98 kg/m 2 1.89 m 2 Preferred Pharmacy Pharmacy Name Phone [address] [facility] [phone] Your Updated Medication List  
  
   
This list is accurate as of: 7/27/17 11:41 AM.  Always use your most recent med list.  
  
  
  
  
 Ferrous Fumarate 325 mg (106 mg iron) Tab Take  by mouth two (2) times a week. finasteride 5 mg tablet Commonly known as:  PROSCAR Take 5 mg by mouth daily. metoprolol tartrate 50 mg tablet Commonly known as:  LOPRESSOR Take 25 mg by mouth two (2) times a day. nitroglycerin 0.4 mg SL tablet Commonly known as:  NITROSTAT  
1 Tab by SubLINGual route every five (5) minutes as needed for Chest Pain. VITAMIN D3 1,000 unit tablet Generic drug:  cholecalciferol Take 1,000 Units by mouth Before breakfast, lunch, and dinner. warfarin 5 mg tablet Commonly known as:  COUMADIN Take 1 Tab by mouth daily. Or as directed by [facility] We Performed the Following CBC W/O DIFF [77711 CPT(R)] METABOLIC PANEL, BASIC [78387 CPT(R)] Follow-up Instructions Return in about 6 months (around 1/27/2018). Patient Instructions Stop Digoxin. Please return to the office in 2 weeks for EKG. Get blood work for CBC and BMP to be done at the [facility].  
 
 
  
Introducing [facility] & HEALTH SERVICES! [employer] introduces Peers App patient portal. Now you can access parts of your medical record, email your doctor's office, and request medication refills online. 1. In your internet browser, go to [email or web address] 2. Click on the First Time User? Click Here link in the Sign In box. You will see the New Member Sign Up page. 3. Enter your Peers App Access Code exactly as it appears below. You will not need to use this code after youve completed the sign-up process. If you do not sign up before the expiration date, you must request a new code. · Peers App Access Code: [email or web address] Expires: 8/14/2017 11:55 AM 
 
4. Enter the last four digits of your Social Security Number (xxxx) and Date of Birth (mm/dd/yyyy) as indicated and click Submit. You will be taken to the next sign-up page. 5. Create a Peers App ID. This will be your Peers App login ID and cannot be changed, so think of one that is secure and easy to remember. 6. Create a Peers App password. You can change your password at any time. 7. Enter your Password Reset Question and Answer. This can be used at a later time if you forget your password. 8. Enter your e-mail address. You will receive e-mail notification when new information is available in [address] 9. Click Sign Up. You can now view and download portions of your medical record. 10. Click the Download Summary menu link to download a portable copy of your medical information. If you have questions, please visit the Frequently Asked Questions section of the Peers App website. Remember, Peers App is NOT to be used for urgent needs. For medical emergencies, dial 911. Now available from your iPhone and Android! Please provide this summary of care documentation to your next provider. If you have any questions after today's visit, please call [phone].

## 2017-07-27 NOTE — PATIENT INSTRUCTIONS
Stop Digoxin. Please return to the office in 2 weeks for EKG.      Get blood work for CBC and BMP to be done at the [facility].

## 2017-07-27 NOTE — PROGRESS NOTES
Visit Vitals    /74 (BP 1 Location: Left arm, BP Patient Position: Sitting)    Pulse 60    Resp 20    Ht 5' 11\" (1.803 m)    Wt 157 lb 9.6 oz (71.5 kg)    SpO2 95%    BMI 21.98 kg/m2

## 2017-08-02 ENCOUNTER — DOCUMENTATION ONLY (OUTPATIENT)
Dept: CARDIOLOGY CLINIC | Age: 82
End: 2017-08-02

## 2017-08-02 NOTE — PROGRESS NOTES
7/31/17-  CBC  WBC 5.1  HGB 12.9  HCT 39.0    BMP     BUN 20  Cr 1.2  Sodium 141  Potassium 4.0  Chlor 108    You ordered labs due to pt feeling dizzy and being on Warfarin. Labs drawn at [facility].

## 2017-08-10 ENCOUNTER — CLINICAL SUPPORT (OUTPATIENT)
Dept: CARDIOLOGY CLINIC | Age: 82
End: 2017-08-10

## 2017-08-10 VITALS — DIASTOLIC BLOOD PRESSURE: 60 MMHG | HEART RATE: 68 BPM | SYSTOLIC BLOOD PRESSURE: 99 MMHG

## 2017-08-10 DIAGNOSIS — R00.1 BRADYCARDIA: Primary | ICD-10-CM

## 2017-08-10 NOTE — PROGRESS NOTES
Pt here for EKG/BP check after having Digoxin d/harry. Pt states that shortness of breath has not improved.

## 2017-08-30 ENCOUNTER — CLINICAL SUPPORT (OUTPATIENT)
Dept: CARDIOLOGY CLINIC | Age: 82
End: 2017-08-30

## 2017-08-30 DIAGNOSIS — I48.91 ATRIAL FIBRILLATION, UNSPECIFIED TYPE (HCC): ICD-10-CM

## 2017-08-30 DIAGNOSIS — Z79.01 LONG-TERM (CURRENT) USE OF ANTICOAGULANTS: ICD-10-CM

## 2017-08-30 DIAGNOSIS — Z79.01 LONG TERM (CURRENT) USE OF ANTICOAGULANTS: Primary | ICD-10-CM

## 2017-08-30 LAB
INR BLD: 2
PT POC: 23.7 SECONDS
VALID INTERNAL CONTROL?: YES

## 2017-09-26 ENCOUNTER — CLINICAL SUPPORT (OUTPATIENT)
Dept: CARDIOLOGY CLINIC | Age: 82
End: 2017-09-26

## 2017-09-26 DIAGNOSIS — Z79.01 LONG-TERM (CURRENT) USE OF ANTICOAGULANTS: Primary | ICD-10-CM

## 2017-09-26 DIAGNOSIS — I48.91 ATRIAL FIBRILLATION, UNSPECIFIED TYPE (HCC): ICD-10-CM

## 2017-09-26 LAB
INR BLD: 2
INR, EXTERNAL: 2
PT POC: 23.3 SECONDS
VALID INTERNAL CONTROL?: YES

## 2017-10-24 ENCOUNTER — CLINICAL SUPPORT (OUTPATIENT)
Dept: CARDIOLOGY CLINIC | Age: 82
End: 2017-10-24

## 2017-10-24 DIAGNOSIS — Z79.01 LONG-TERM (CURRENT) USE OF ANTICOAGULANTS: Primary | ICD-10-CM

## 2017-10-24 DIAGNOSIS — I48.91 ATRIAL FIBRILLATION, UNSPECIFIED TYPE (HCC): ICD-10-CM

## 2017-10-24 LAB
INR BLD: 2.4
INR, EXTERNAL: 2.4 (ref 2–3)
PT POC: 29 SECONDS
VALID INTERNAL CONTROL?: YES

## 2017-11-08 ENCOUNTER — OFFICE VISIT (OUTPATIENT)
Dept: CARDIOLOGY CLINIC | Age: 82
End: 2017-11-08

## 2017-11-08 VITALS
BODY MASS INDEX: 22.37 KG/M2 | HEART RATE: 76 BPM | SYSTOLIC BLOOD PRESSURE: 125 MMHG | DIASTOLIC BLOOD PRESSURE: 62 MMHG | WEIGHT: 159.8 LBS | HEIGHT: 71 IN

## 2017-11-08 DIAGNOSIS — I48.91 ATRIAL FIBRILLATION, UNSPECIFIED TYPE (HCC): ICD-10-CM

## 2017-11-08 DIAGNOSIS — Z79.01 LONG-TERM (CURRENT) USE OF ANTICOAGULANTS: ICD-10-CM

## 2017-11-08 DIAGNOSIS — E78.5 DYSLIPIDEMIA: Primary | ICD-10-CM

## 2017-11-08 DIAGNOSIS — I05.9 MITRAL VALVE DISORDER: ICD-10-CM

## 2017-11-08 NOTE — PROGRESS NOTES
Visit Vitals    /62    Pulse 76    Ht 5' 11\" (1.803 m)    Wt 159 lb 12.8 oz (72.5 kg)    BMI 22.29 kg/m2     Medication changes for this OV VO Dr Karina Blankenship

## 2017-11-08 NOTE — MR AVS SNAPSHOT
Visit Information Date & Time Provider Department Dept. Phone Encounter #  
 11/8/2017  3:00 PM Cleopatra Fletcher MD [facility] Brent Williamson [phone] [id number] Follow-up Instructions Return in about 6 months (around 5/8/2018). Your Appointments 11/21/2017  2:00 PM  
[facility] CL[facility]IC with GUNJAN PLASCENCIA  
[facility] (MARIA SCHEDULING) Appt Note: f/u sov$[id number] crf 10/24/17  
 [address] [facility] [address]  
[phone]  
  
   
 [address] [address]  
  
    
 1/30/2018  1:20 PM  
ESTABLISHED PATIENT with Cleopatra Fletcher MD  
[facility] (MARIA SCHEDULING) Appt Note: 6 MO FUP  
 [address] [place] [address] [address] [address] [address] Upcoming Health Maintenance Date Due ZOSTER VACCINE AGE 60> 2/11/1986 GLAUCOMA SCREENING Q2Y 4/11/1991 Pneumococcal 65+ Low/Medium Risk (1 of 2 - PCV13) 4/11/1991 MEDICARE YEARLY EXAM 4/11/1991 DTaP/Tdap/Td series (1 - Tdap) 6/2/2005 Influenza Age 5 to Adult 8/1/2017 Allergies as of 11/8/2017  Review Complete On: 11/8/2017 By: Cleopatra Fletcher MD  
  
 Severity Noted Reaction Type Reactions Cortisone  10/29/2010    Hives Plavix [Clopidogrel]  08/28/2013    Unknown (comments) Current Immunizations  Never Reviewed Name Date Td 6/1/2005 Tetanus Toxoid 1/1/2002, 8/2/2001 Not reviewed this visit You Were Diagnosed With   
  
 Codes Comments Dyslipidemia    -  Primary ICD-10-CM: E78.5 ICD-9-CM: 272.4 Atrial fibrillation, unspecified type ([facility] [address]     ICD-10-CM: I48.91 
ICD-9-CM: 427.31 Mitral valve disorder     ICD-10-CM: I05.9 ICD-9-CM: 394.9 Long-term (current) use of anticoagulants     ICD-10-CM: Z79.01 
ICD-9-CM: V58.61 Vitals BP Pulse Height(growth percentile) Weight(growth percentile) BMI Smoking Status 125/62 76 5' 11\" (1.803 m) 159 lb 12.8 oz (72.5 kg) 22.29 kg/m2 Never Smoker Vitals History BMI and BSA Data Body Mass Index Body Surface Area  
 22.29 kg/m 2 1.91 m 2 Preferred Pharmacy Pharmacy Name Phone [address] [phone] Your Updated Medication List  
  
   
This list is accurate as of: 11/8/17  3:59 PM.  Always use your most recent med list.  
  
  
  
  
 Ferrous Fumarate 325 mg (106 mg iron) Tab Take  by mouth two (2) times a week. finasteride 5 mg tablet Commonly known as:  PROSCAR Take 5 mg by mouth daily. metoprolol tartrate 50 mg tablet Commonly known as:  LOPRESSOR Take 25 mg by mouth two (2) times a day. VITAMIN D3 1,000 unit tablet Generic drug:  cholecalciferol Take 1,000 Units by mouth Before breakfast, lunch, and dinner. warfarin 5 mg tablet Commonly known as:  COUMADIN Take 1 Tab by mouth daily. Or as directed by [facility] Cliinic Follow-up Instructions Return in about 6 months (around 5/8/2018). Introducing [facility] & HE[facility] SERVICES! Lola Tracey introduces vzaar patient portal. Now you can access parts of your medical record, email your doctor's office, and request medication refills online. 1. In your internet browser, go to [email or web address] 2. Click on the First Time User? Click Here link in the Sign In box. You will see the New Member Sign Up page. 3. Enter your vzaar Access Code exactly as it appears below. You will not need to use this code after youve completed the sign-up process. If you do not sign up before the expiration date, you must request a new code. · vzaar Access Code: [email or web address] Expires: 11/28/2017 12:39 PM 
 
4.  Enter the last four digits of your Social Security Number (xxxx) and Date of Birth (mm/dd/yyyy) as indicated and click Submit. You will be taken to the next sign-up page. 5. Create a Nerdies ID. This will be your Nerdies login ID and cannot be changed, so think of one that is secure and easy to remember. 6. Create a Nerdies password. You can change your password at any time. 7. Enter your Password Reset Question and Answer. This can be used at a later time if you forget your password. 8. Enter your e-mail address. You will receive e-mail notification when new information is available in [address] 9. Click Sign Up. You can now view and download portions of your medical record. 10. Click the Download Summary menu link to download a portable copy of your medical information. If you have questions, please visit the Frequently Asked Questions section of the Nerdies website. Remember, Nerdies is NOT to be used for urgent needs. For medical emergencies, dial 911. Now available from your iPhone and Android! Please provide this summary of care documentation to your next provider. If you have any questions after today's visit, please call [phone].

## 2017-11-08 NOTE — PROGRESS NOTES
LAST OFFICE VISIT : 10/24/2017        ICD-10-CM ICD-9-CM   1. Dyslipidemia E78.5 272.4   2. Atrial fibrillation, unspecified type ([address] I48.91 427.31   3. Mitral valve disorder I05.9 394.9   4. Long-term (current) use of anticoagulants Z79.01 V58.61          Srinivasan Mccray is a 80 y.o. male with HTN and hypercholesterolemia referred for routine follow up. Cardiac risk factors: dyslipidemia, sedentary life style, male gender, hypertension  I have personally obtained the history from the patient. HISTORY OF PRESENTING ILLNESS      Mr. Beverly Neri feels well overall with no interval issues. He has had no recurrence of dizziness. The patient denies chest pain/ shortness of breath, orthopnea, PND, LE edema, palpitations, syncope, presyncope or fatigue. He denies any bleeding issues on Coumadin. He drove himself today.      ACTIVE PROBLEM LIST     Patient Active Problem List    Diagnosis Date Noted    Bradycardia 05/22/2017    Dyslipidemia 11/11/2015    Disorder of skin 08/28/2013    Skin lesion 08/28/2013    Decreased hearing 08/28/2013    Paresthesia 08/28/2013    Osteopenia 08/28/2013    Nonspecific (abnormal) findings on radiological and other examination of other intrathoracic organs 08/28/2013    Personal history of malignant melanoma of skin 08/28/2013    Long-term (current) use of anticoagulants 08/28/2013    Orthostatic hypotension 08/16/2012    A-fib ([address] 02/17/2012    Mitral valve disorder 02/17/2012           PAST MEDICAL HISTORY     Past Medical History:   Diagnosis Date    Atrial fibrillation ([address]     Decreased hearing 8/28/2013    Disorder of skin 8/28/2013    GI AVM (gastrointestinal arteriovenous vascular malformation) 2005    Dr. Amol Hernandes Long-term (current) use of anticoagulants 8/28/2013    Mitral valve disorders(424.0)     Mixed hyperlipidemia     Nonspecific (abnormal) findings on radiological and other examination of other intrathoracic organs 8/28/2013    Lung  Orthostatic hypotension     ELIANE (obstructive sleep apnea) 2010    Dr. Araceli Vicente Osteopenia 8/28/2013    Paresthesia 8/28/2013    Personal history of malignant melanoma of skin 8/28/2013    Skin lesion 8/28/2013           PAST SURGICAL HISTORY     No past surgical history on file. ALLERGIES     Allergies   Allergen Reactions    Cortisone Hives    Plavix [Clopidogrel] Unknown (comments)          FAMILY HISTORY     No family history on file. negative for cardiac disease       SOCIAL HISTORY     Social History     Social History    Marital status:      Spouse name: N/A    Number of children: N/A    Years of education: N/A     Social History Main Topics    Smoking status: Never Smoker    Smokeless tobacco: Never Used    Alcohol use No    Drug use: None    Sexual activity: Not Asked     Other Topics Concern    None     Social History Narrative         MEDICATIONS     Current Outpatient Prescriptions   Medication Sig    metoprolol tartrate (LOPRESSOR) 50 mg tablet Take 25 mg by mouth two (2) times a day.  cholecalciferol (VITAMIN D3) 1,000 unit tablet Take 1,000 Units by mouth Before breakfast, lunch, and dinner.  finasteride (PROSCAR) 5 mg tablet Take 5 mg by mouth daily.  Ferrous Fumarate 325 mg (106 mg iron) tab Take  by mouth two (2) times a week.  warfarin (COUMADIN) 5 mg tablet Take 1 Tab by mouth daily. Or as directed by Coumadin Cliinic     No current facility-administered medications for this visit. I have reviewed the nurses notes, vitals, problem list, allergy list, medical history, family, social history and medications. REVIEW OF SYMPTOMS      General: Pt denies excessive weight gain or loss. Pt is able to conduct ADL's  HEENT: Denies blurred vision, headaches, hearing loss, epistaxis and difficulty swallowing. Respiratory: Denies cough, congestion, shortness of breath, GASTELUM, wheezing or stridor.   Cardiovascular: Denies precordial pain, palpitations, edema or PND  Gastrointestinal: Denies poor appetite, indigestion, abdominal pain or blood in stool  Genitourinary: Denies hematuria, dysuria, increased urinary frequency  Musculoskeletal: Denies joint pain or swelling from muscles or joints  Neurologic: Denies tremor, paresthesias, headache, or sensory motor disturbance  Psychiatric: Denies confusion, insomnia, depression  Integumentray: Denies rash, itching or ulcers. Hematologic: Denies easy bruising, bleeding     PHYSICAL EXAMINATION      Vitals:    11/08/17 1514   BP: 125/62   Pulse: 76   Weight: 159 lb 12.8 oz (72.5 kg)   Height: 5' 11\" (1.803 m)     General: Well developed, in no acute distress. HEENT: No jaundice, oral mucosa moist, no oral ulcers  Neck: Supple, no stiffness, no lymphadenopathy, supple  Heart:  Normal S1/S2 negative S3 or S4. Regular, no murmur, gallop or rub, no jugular venous distention  Respiratory: Clear bilaterally x 4, no wheezing or rales  Extremities:  Trace R ankle edema   Musculoskeletal: No clubbing, no deformities  Neuro: A&Ox3, speech clear, gait stable, cooperative, no focal neurologic deficits  Skin: Skin color is normal. No rashes or lesions. Non diaphoretic, moist.       DIAGNOSTIC DATA     1. Echo   10/11/10-EF 60%, DD, GIFTY, mild MR TR    2. Holter monitor   06/20/17- predominant rhythm, sinus bradycardia 1 st degree AV block HR varied from  bpm rare PVCs, 8.1%     3.  Cholesterol Profile  11/03/16- , HDL 63, ,          LABORATORY DATA            Lab Results   Component Value Date/Time    WBC 7.8 01/14/2017 12:22 PM    HGB 13.1 01/14/2017 12:22 PM    HCT 40.5 01/14/2017 12:22 PM    PLATELET 028 22/13/0513 12:22 PM    MCV 91.0 01/14/2017 12:22 PM      Lab Results   Component Value Date/Time    Sodium 138 01/14/2017 12:22 PM    Potassium 4.2 01/14/2017 12:22 PM    Chloride 105 01/14/2017 12:22 PM    CO2 26 01/14/2017 12:22 PM    Anion gap 7 01/14/2017 12:22 PM    Glucose 109 01/14/2017 12:22 PM BUN 22 01/14/2017 12:22 PM    Creatinine 1.11 01/14/2017 12:22 PM    BUN/Creatinine ratio 20 01/14/2017 12:22 PM    GFR est AA >60 01/14/2017 12:22 PM    GFR est non-AA >60 01/14/2017 12:22 PM    Calcium 8.5 01/14/2017 12:22 PM           ASSESSMENT/RECOMMENDATIONS:.      1. PAF  -rate is controlled. Had 1 episode of dizziness but no recurrence. He is off Digoxin and on Metoprolol. If he does have dizziness will have him stop Metoprolol and put a monitor on him.      2. MR  -seems relatively stable. No further testing given his age.      3. Anticoagulation   -No bleeding issues on Coumadin. Will give him lab slip for CBC and BMP     4. Return in 6 months, sooner PRN    No orders of the defined types were placed in this encounter. Follow-up Disposition:  Return in about 6 months (around 5/8/2018). I have discussed the diagnosis with  Meaghan Adame and the intended plan as seen in the above orders. Questions were answered concerning future plans. I have discussed medication side effects and warnings with the patient as well. Thank you,  No primary care provider on file. for involving me in the care of  Meaghan Adame. Please do not hesitate to contact me for further questions/concerns. Written by Kellee Middleton, dictated by Ngozi Garduno MD.    Fatoumata Esparza MD, [address] [place] [address] Rd., [address]      [facility], [address] [place] [address] Drive      [phone] / [phone] Fax

## 2017-11-13 DIAGNOSIS — I05.9 MITRAL VALVE DISORDER: ICD-10-CM

## 2017-11-13 DIAGNOSIS — I48.20 CHRONIC ATRIAL FIBRILLATION (HCC): ICD-10-CM

## 2017-11-13 RX ORDER — WARFARIN SODIUM 5 MG/1
5 TABLET ORAL DAILY
Qty: 102 TAB | Refills: 1 | Status: SHIPPED | OUTPATIENT
Start: 2017-11-13 | End: 2017-11-13 | Stop reason: SDUPTHER

## 2017-11-13 RX ORDER — WARFARIN SODIUM 5 MG/1
5 TABLET ORAL DAILY
Qty: 102 TAB | Refills: 1 | Status: SHIPPED | OUTPATIENT
Start: 2017-11-13 | End: 2018-01-01 | Stop reason: SDUPTHER

## 2017-11-13 NOTE — TELEPHONE ENCOUNTER
rx approved by Dr. Karina Blankenship    Requested Prescriptions     Pending Prescriptions Disp Refills    warfarin (COUMADIN) 5 mg tablet 102 Tab 1     Sig: Take 1 Tab by mouth daily.  Or as directed by Coumadin Cliinic   Indications: PREVENT THROMBOEMBOLISM IN CHRONIC ATRIAL FIBRILLATION     Sent to pharmacy [facility] on [facility] [employer] on [address] [facility]

## 2017-11-21 ENCOUNTER — CLINICAL SUPPORT (OUTPATIENT)
Dept: CARDIOLOGY CLINIC | Age: 82
End: 2017-11-21

## 2017-11-21 DIAGNOSIS — Z79.01 LONG-TERM (CURRENT) USE OF ANTICOAGULANTS: Primary | ICD-10-CM

## 2017-11-21 DIAGNOSIS — I48.91 ATRIAL FIBRILLATION, UNSPECIFIED TYPE (HCC): ICD-10-CM

## 2017-11-21 LAB
INR BLD: 2
INR, EXTERNAL: 2
INR, EXTERNAL: 2
PT POC: 20.8 SECONDS
VALID INTERNAL CONTROL?: YES

## 2018-01-01 ENCOUNTER — HOSPITAL ENCOUNTER (EMERGENCY)
Age: 83
Discharge: HOME | End: 2018-12-11
Attending: EMERGENCY MEDICINE
Payer: MEDICARE

## 2018-01-01 ENCOUNTER — OFFICE VISIT (OUTPATIENT)
Dept: CARDIOLOGY CLINIC | Age: 83
End: 2018-01-01

## 2018-01-01 ENCOUNTER — CLINICAL SUPPORT (OUTPATIENT)
Dept: CARDIOLOGY CLINIC | Age: 83
End: 2018-01-01

## 2018-01-01 ENCOUNTER — APPOINTMENT (OUTPATIENT)
Dept: CT IMAGING | Age: 83
End: 2018-01-01
Attending: EMERGENCY MEDICINE
Payer: MEDICARE

## 2018-01-01 VITALS
HEART RATE: 76 BPM | OXYGEN SATURATION: 98 % | SYSTOLIC BLOOD PRESSURE: 110 MMHG | RESPIRATION RATE: 16 BRPM | WEIGHT: 159 LBS | HEIGHT: 71 IN | BODY MASS INDEX: 22.26 KG/M2 | DIASTOLIC BLOOD PRESSURE: 70 MMHG

## 2018-01-01 VITALS
HEIGHT: 71 IN | OXYGEN SATURATION: 97 % | HEART RATE: 69 BPM | SYSTOLIC BLOOD PRESSURE: 121 MMHG | DIASTOLIC BLOOD PRESSURE: 79 MMHG | TEMPERATURE: 97.7 F | RESPIRATION RATE: 20 BRPM | WEIGHT: 164 LBS | BODY MASS INDEX: 22.96 KG/M2

## 2018-01-01 VITALS
RESPIRATION RATE: 16 BRPM | DIASTOLIC BLOOD PRESSURE: 88 MMHG | HEIGHT: 71 IN | HEART RATE: 74 BPM | BODY MASS INDEX: 22.73 KG/M2 | SYSTOLIC BLOOD PRESSURE: 142 MMHG | WEIGHT: 162.4 LBS

## 2018-01-01 DIAGNOSIS — I48.0 PAROXYSMAL ATRIAL FIBRILLATION (HCC): ICD-10-CM

## 2018-01-01 DIAGNOSIS — I48.91 ATRIAL FIBRILLATION, UNSPECIFIED TYPE (HCC): ICD-10-CM

## 2018-01-01 DIAGNOSIS — I95.1 ORTHOSTATIC HYPOTENSION: ICD-10-CM

## 2018-01-01 DIAGNOSIS — I05.9 MITRAL VALVE DISORDER: ICD-10-CM

## 2018-01-01 DIAGNOSIS — Z79.01 LONG TERM (CURRENT) USE OF ANTICOAGULANTS: Primary | ICD-10-CM

## 2018-01-01 DIAGNOSIS — E78.5 DYSLIPIDEMIA: ICD-10-CM

## 2018-01-01 DIAGNOSIS — S01.01XA LACERATION OF SCALP, INITIAL ENCOUNTER: ICD-10-CM

## 2018-01-01 DIAGNOSIS — I48.20 CHRONIC ATRIAL FIBRILLATION (HCC): ICD-10-CM

## 2018-01-01 DIAGNOSIS — I48.20 CHRONIC ATRIAL FIBRILLATION (HCC): Primary | ICD-10-CM

## 2018-01-01 DIAGNOSIS — E78.5 DYSLIPIDEMIA: Primary | ICD-10-CM

## 2018-01-01 DIAGNOSIS — Z79.01 LONG-TERM (CURRENT) USE OF ANTICOAGULANTS: Primary | ICD-10-CM

## 2018-01-01 DIAGNOSIS — S09.90XA CLOSED HEAD INJURY, INITIAL ENCOUNTER: Primary | ICD-10-CM

## 2018-01-01 LAB
INR BLD: 2
INR BLD: 2.2
INR BLD: 2.2
INR BLD: 2.3
INR BLD: 2.4
INR BLD: 2.4
INR BLD: 2.4 (ref 0.9–1.2)
INR BLD: 2.5
INR BLD: 2.5
INR BLD: 2.6
INR BLD: 3.1
INR, EXTERNAL: 2 (ref 2–3)
INR, EXTERNAL: 2.2 (ref 2–3)
INR, EXTERNAL: 2.2 (ref 2–3)
INR, EXTERNAL: 2.3 (ref 2–3)
INR, EXTERNAL: 2.4 (ref 2–3)
INR, EXTERNAL: 2.4 (ref 2–3)
INR, EXTERNAL: 2.5 (ref 2–3)
INR, EXTERNAL: 2.5 (ref 2–3)
INR, EXTERNAL: 2.6 (ref 2–3)
INR, EXTERNAL: 3.1 (ref 2–3)
PT POC: 22.8 SECONDS
PT POC: 26 SECONDS
PT POC: 27 SECONDS
PT POC: 27.8 SECONDS
PT POC: 28.4 SECONDS
PT POC: 29 SECONDS
PT POC: 29.5 SECONDS
PT POC: 30.4 SECONDS
PT POC: 30.9 SECONDS
PT POC: 31 SECONDS
PT POC: 31.6 SECONDS
PT POC: 42.6 SECONDS
VALID INTERNAL CONTROL?: YES

## 2018-01-01 PROCEDURE — 70450 CT HEAD/BRAIN W/O DYE: CPT

## 2018-01-01 PROCEDURE — 85610 PROTHROMBIN TIME: CPT

## 2018-01-01 PROCEDURE — 99283 EMERGENCY DEPT VISIT LOW MDM: CPT

## 2018-01-01 PROCEDURE — 75810000293 HC SIMP/SUPERF WND  RPR

## 2018-01-01 PROCEDURE — 77030018836 HC SOL IRR NACL ICUM -A

## 2018-01-01 PROCEDURE — 74011000250 HC RX REV CODE- 250: Performed by: EMERGENCY MEDICINE

## 2018-01-01 PROCEDURE — 77030031139 HC SUT VCRL2 J&J -A

## 2018-01-01 RX ORDER — WARFARIN SODIUM 5 MG/1
5 TABLET ORAL DAILY
Qty: 102 TAB | Refills: 1 | Status: SHIPPED | OUTPATIENT
Start: 2018-01-01

## 2018-01-01 RX ORDER — LIDOCAINE HYDROCHLORIDE AND EPINEPHRINE 10; 10 MG/ML; UG/ML
1.5 INJECTION, SOLUTION INFILTRATION; PERINEURAL ONCE
Status: COMPLETED | OUTPATIENT
Start: 2018-01-01 | End: 2018-01-01

## 2018-01-01 RX ADMIN — LIDOCAINE HYDROCHLORIDE,EPINEPHRINE BITARTRATE 15 MG: 10; .01 INJECTION, SOLUTION INFILTRATION; PERINEURAL at 16:59

## 2018-05-09 NOTE — PROGRESS NOTES
Chief Complaint   Patient presents with    Follow-up     1. Have you been to the ER, urgent care clinic since your last visit? Hospitalized since your last visit? No    2. Have you seen or consulted any other health care providers outside of the [facility] since your last visit? Include any pap smears or colon screening.  No  Visit Vitals    /70 (BP 1 Location: Left arm, BP Patient Position: Sitting)    Pulse 76    Resp 16    Ht 5' 11\" (1.803 m)    Wt 159 lb (72.1 kg)    SpO2 98%    BMI 22.18 kg/m2

## 2018-05-09 NOTE — MR AVS SNAPSHOT
[address] [facility] [address] [phone] Patient: Jasmeet Mckay MRN: [id number] Visit Information Date & Time Provider Department Dept. Phone Encounter #  
 2018  3:00 PM Jose Alberto Cervantes MD [facility] Ryanne Levine [phone] [id number] Follow-up Instructions Return in about 6 months (around 2018). Upcoming Health Maintenance Date Due ZOSTER VACCINE AGE 60> 1986 GLAUCOMA SCREENING Q2Y 1991 Pneumococcal 65+ Low/Medium Risk (1 of 2 - PCV13) 1991 DTaP/Tdap/Td series (1 - Tdap) 2005 MEDICARE YEARLY EXAM 3/14/2018 Influenza Age 5 to Adult 2018 Allergies as of 2018  Review Complete On: 2018 By: Jose Alberto Cervantes MD  
  
 Severity Noted Reaction Type Reactions Cortisone  10/29/2010    Hives Plavix [Clopidogrel]  2013    Unknown (comments) Current Immunizations  Never Reviewed Name Date Td 2005 Tetanus Toxoid 2002, 2001 Not reviewed this visit You Were Diagnosed With   
  
 Codes Comments Dyslipidemia    -  Primary ICD-10-CM: E78.5 ICD-9-CM: 272.4 Paroxysmal atrial fibrillation (HCC)     ICD-10-CM: I48.0 ICD-9-CM: 427.31 Mitral valve disorder     ICD-10-CM: I05.9 ICD-9-CM: 394.9 Orthostatic hypotension     ICD-10-CM: I95.1 ICD-9-CM: 458.0 Vitals BP Pulse Resp Height(growth percentile) Weight(growth percentile) SpO2  
 110/70 (BP 1 Location: Left arm, BP Patient Position: Sitting) 76 16 5' 11\" (1.803 m) 159 lb (72.1 kg) 98% BMI Smoking Status 22.18 kg/m2 Never Smoker BMI and BSA Data Body Mass Index Body Surface Area  
 22.18 kg/m 2 1.9 m 2 Preferred Pharmacy Pharmacy Name Phone [facility] [address] [place] [address] [phone] Your Updated Medication List  
  
   
 This list is accurate as of 5/9/18  3:35 PM.  Always use your most recent med list.  
  
  
  
  
 Ferrous Fumarate 325 mg (106 mg iron) Tab Take  by mouth two (2) times a week. finasteride 5 mg tablet Commonly known as:  PROSCAR Take 5 mg by mouth daily. metoprolol tartrate 50 mg tablet Commonly known as:  LOPRESSOR Take 25 mg by mouth two (2) times a day. VITAMIN D3 1,000 unit tablet Generic drug:  cholecalciferol Take 1,000 Units by mouth Before breakfast, lunch, and dinner. warfarin 5 mg tablet Commonly known as:  COUMADIN Take 1 Tab by mouth daily. Or as directed by Coumadin Cliinic   Indications: PREVENT THROMBOEMBOLISM IN CHRONIC ATRIAL FIBRILLATION Follow-up Instructions Return in about 6 months (around 11/9/2018). Introducing [facility] & HEALTH SERVICES! Chrissy Thompson introduces [employer] patient portal. Now you can access parts of your medical record, email your doctor's office, and request medication refills online. 1. In your internet browser, go to [email or web address] 2. Click on the First Time User? Click Here link in the Sign In box. You will see the New Member Sign Up page. 3. Enter your Weatlas Access Code exactly as it appears below. You will not need to use this code after youve completed the sign-up process. If you do not sign up before the expiration date, you must request a new code. · Weatlas Access Code: [email or web address] Expires: 7/16/2018  2:28 PM 
 
4. Enter the last four digits of your Social Security Number (xxxx) and Date of Birth (mm/dd/yyyy) as indicated and click Submit. You will be taken to the next sign-up page. 5. Create a Weatlas ID. This will be your Weatlas login ID and cannot be changed, so think of one that is secure and easy to remember. 6. Create a Weatlas password. You can change your password at any time. 7. Enter your Password Reset Question and Answer.  This can be used at a later time if you forget your password. 8. Enter your e-mail address. You will receive e-mail notification when new information is available in [address] 9. Click Sign Up. You can now view and download portions of your medical record. 10. Click the Download Summary menu link to download a portable copy of your medical information. If you have questions, please visit the Frequently Asked Questions section of the [employer] website. Remember, [employer] is NOT to be used for urgent needs. For medical emergencies, dial 911. Now available from your iPhone and Android! Please provide this summary of care documentation to your next provider. If you have any questions after today's visit, please call [phone].

## 2018-05-09 NOTE — PROGRESS NOTES
LAST OFFICE VISIT : 4/17/2018        ICD-10-CM ICD-9-CM   1. Dyslipidemia E78.5 272.4   2. Paroxysmal atrial fibrillation (HCC) I48.0 427.31   3. Mitral valve disorder I05.9 394.9   4. Orthostatic hypotension I95.1 458.0       Pippa Vera is a 80 y.o. male with PAF, mitral valve disorder, HTN and hypercholesterolemia referred for 6 month follow up.      Cardiac risk factors: dyslipidemia, sedentary life style, male gender, hypertension  I have personally obtained the history from the patient. HISTORY OF PRESENTING ILLNESS      Mr. Claudeen Stabile has felt fatigued overall. Yesterday he cut the grass and today he went to the grocery store and the bank. He will sometimes get GASTELUM, but this will resolve if he sits down and rests. His HR has been fast a couple of times, but these episodes do not last long. He states his last thyroid levels were perfect. The patient denies chest pain, orthopnea, PND, LE edema, palpitations, syncope, presyncope or fatigue. No bleeding issues on Coumadin. He gets blood work done at the [place] every 6 months. His next appointment is next week and he will be getting a CBC and TSH at that time. Of note, pt will be moving in the near future to a [facility] home. Pt still drives. He is here with his wife today.       ACTIVE PROBLEM LIST     Patient Active Problem List    Diagnosis Date Noted    Bradycardia 05/22/2017    Dyslipidemia 11/11/2015    Disorder of skin 08/28/2013    Skin lesion 08/28/2013    Decreased hearing 08/28/2013    Paresthesia 08/28/2013    Osteopenia 08/28/2013    Nonspecific (abnormal) findings on radiological and other examination of other intrathoracic organs 08/28/2013    Personal history of malignant melanoma of skin 08/28/2013    Long-term (current) use of anticoagulants 08/28/2013    Orthostatic hypotension 08/16/2012    A-fib ([address] 02/17/2012    Mitral valve disorder 02/17/2012           PAST MEDICAL HISTORY     Past Medical History:   Diagnosis Date    Atrial fibrillation ([facility] [address]     Decreased hearing 8/28/2013    Disorder of skin 8/28/2013    GI AVM (gastrointestinal arteriovenous vascular malformation) 2005    Dr. Feliciano Beavers Long-term (current) use of anticoagulants 8/28/2013    Mitral valve disorders(424.0)     Mixed hyperlipidemia     Nonspecific (abnormal) findings on radiological and other examination of other intrathoracic organs 8/28/2013    Lung    Orthostatic hypotension     ELIANE (obstructive sleep apnea) 2010    Dr. Jeremias Pineda Osteopenia 8/28/2013    Paresthesia 8/28/2013    Personal history of malignant melanoma of skin 8/28/2013    Skin lesion 8/28/2013           PAST SURGICAL HISTORY     No past surgical history on file. ALLERGIES     Allergies   Allergen Reactions    Cortisone Hives    Plavix [Clopidogrel] Unknown (comments)          FAMILY HISTORY     No family history on file. negative for cardiac disease       SOCIAL HISTORY     Social History     Social History    Marital status:      Spouse name: N/A    Number of children: N/A    Years of education: N/A     Social History Main Topics    Smoking status: Never Smoker    Smokeless tobacco: Never Used    Alcohol use No    Drug use: Not on file    Sexual activity: Not on file     Other Topics Concern    Not on file     Social History Narrative         MEDICATIONS     Current Outpatient Prescriptions   Medication Sig    warfarin (COUMADIN) 5 mg tablet Take 1 Tab by mouth daily. Or as directed by [facility]   Indications: PREVENT THROMBOEMBOLISM IN CHRONIC ATRIAL FIBRILLATION    metoprolol tartrate (LOPRESSOR) 50 mg tablet Take 25 mg by mouth two (2) times a day.  cholecalciferol (VITAMIN D3) 1,000 unit tablet Take 1,000 Units by mouth Before breakfast, lunch, and dinner.  finasteride (PROSCAR) 5 mg tablet Take 5 mg by mouth daily.  Ferrous Fumarate 325 mg (106 mg iron) tab Take  by mouth two (2) times a week.      No current facility-administered medications for this visit. I have reviewed the nurses notes, vitals, problem list, allergy list, medical history, family, social history and medications. REVIEW OF SYMPTOMS      General: Pt denies excessive weight gain or loss. Pt is able to conduct ADL's +fatigue  HEENT: Denies blurred vision, headaches, hearing loss, epistaxis and difficulty swallowing. Respiratory: Denies cough, congestion, wheezing or stridor. +GASTELUM  Cardiovascular: Denies precordial pain, palpitations, edema or PND  Gastrointestinal: Denies poor appetite, indigestion, abdominal pain or blood in stool  Genitourinary: Denies hematuria, dysuria, increased urinary frequency  Musculoskeletal: Denies joint pain or swelling from muscles or joints  Neurologic: Denies tremor, paresthesias, headache, or sensory motor disturbance  Psychiatric: Denies confusion, insomnia, depression  Integumentray: Denies rash, itching or ulcers. Hematologic: Denies easy bruising, bleeding     PHYSICAL EXAMINATION      There were no vitals filed for this visit. General: Well developed, in no acute distress. HEENT: No jaundice, oral mucosa moist, no oral ulcers  Neck: Supple, no stiffness, no lymphadenopathy, supple  Heart:  Normal S1/S2 negative S3 or S4. Regular, no murmur, gallop or rub, no jugular venous distention  Respiratory: Clear bilaterally x 4, no wheezing or rales   Extremities:  No edema, normal cap refill, no cyanosis. Musculoskeletal: No clubbing, no deformities  Neuro: A&Ox3, speech clear, gait stable, cooperative, no focal neurologic deficits  Skin: Skin color is normal. No rashes or lesions. Non diaphoretic, moist.       DIAGNOSTIC DATA     1. Echo   10/11/10-EF 60%, DD, GIFTY, mild MR TR    2. Holter monitor   06/20/17- predominant rhythm, sinus bradycardia 1 st degree AV block HR varied from  bpm rare PVCs, 8.1%     3.  Cholesterol Profile  11/03/16- , HDL 63, ,            LABORATORY DATA Lab Results   Component Value Date/Time    WBC 7.8 01/14/2017 12:22 PM    HGB 13.1 01/14/2017 12:22 PM    HCT 40.5 01/14/2017 12:22 PM    PLATELET 043 66/53/2783 12:22 PM    MCV 91.0 01/14/2017 12:22 PM      Lab Results   Component Value Date/Time    Sodium 138 01/14/2017 12:22 PM    Potassium 4.2 01/14/2017 12:22 PM    Chloride 105 01/14/2017 12:22 PM    CO2 26 01/14/2017 12:22 PM    Anion gap 7 01/14/2017 12:22 PM    Glucose 109 (H) 01/14/2017 12:22 PM    BUN 22 (H) 01/14/2017 12:22 PM    Creatinine 1.11 01/14/2017 12:22 PM    BUN/Creatinine ratio 20 01/14/2017 12:22 PM    GFR est AA >60 01/14/2017 12:22 PM    GFR est non-AA >60 01/14/2017 12:22 PM    Calcium 8.5 01/14/2017 12:22 PM           ASSESSMENT/RECOMMENDATIONS:.      1. PAF  - Rate is controlled. Occasional episodes of irregularity, but nothing sustained. He is anticoagulated and getting INR checked today. He claims he has not bleeding issues on Coumadin. He states he is getting a CBC and TSH at the [place] next week.     2. MR  -seems relatively stable. No further testing given his age.   Aretta Flick  3. Anticoagulation   - No bleeding issues on Coumadin. He is getting his INR checked today.      4. Return in 6 months, sooner PRN    No orders of the defined types were placed in this encounter. Follow-up Disposition:  Return in about 6 months (around 11/9/2018). I have discussed the diagnosis with  Figueroa Erm and the intended plan as seen in the above orders. Questions were answered concerning future plans. I have discussed medication side effects and warnings with the patient as well. Thank you,  No primary care provider on file. for involving me in the care of  Figueroa Erm. Please do not hesitate to contact me for further questions/concerns. Written by Dilma Goodson, dictated by Neftali Montes De Oca MD.    Adelina Snellen Doloresco, MD, [phone] [address], [address]      [phone] / [phone] Fax

## 2018-07-12 NOTE — TELEPHONE ENCOUNTER
Anticoagulation Summary as of 7/11/2018   INR goal 2.0-3.0   Today's INR 2.6   Warfarin maintenance plan 2.5 mg (5 mg x 0.5) on Tue, Thu, Sat; 5 mg (5 mg x 1) all other days   Weekly warfarin total 27.5 mg   Plan last modified Chato Pitts LPN (5/21/9521)   Next INR check 8/8/2018   Target end date Indefinite

## 2018-11-14 NOTE — PROGRESS NOTES
Visit Vitals  /88 (BP 1 Location: Left arm)   Pulse 74   Resp 16   Ht 5' 11\" (1.803 m)   Wt 162 lb 6.4 oz (73.7 kg)   BMI 22.65 kg/m²       Patient is here for follow up. Doing well. No complaints.

## 2018-12-11 NOTE — PROGRESS NOTES
Patient reported a fall on ice earlier this am prior to coming to appointment; his head and right arm was bleeding intermittently. Dr. Zach Bacon nurse team was notified and recommended patient visit ED.

## 2018-12-11 NOTE — DISCHARGE INSTRUCTIONS
Learning About a Closed Head Injury  What is a closed head injury? A closed head injury happens when your head gets hit hard. The strong force of the blow causes your brain to shake in your skull. This movement can cause the brain to bruise, swell, or tear. Sometimes nerves or blood vessels also get damaged. This can cause bleeding in or around the brain. A concussion is a type of closed head injury. What are the symptoms? If you have a mild concussion, you may have a mild headache or feel \"not quite right. \" These symptoms are common. They usually go away over a few days to 4 weeks. But sometimes after a concussion, you feel like you can't function as well as before the injury. And you have new symptoms. This is called postconcussive syndrome. You may:  · Find it harder to solve problems, think, concentrate, or remember. · Have headaches. · Have changes in your sleep patterns, such as not being able to sleep or sleeping all the time. · Have changes in your personality. · Not be interested in your usual activities. · Feel angry or anxious without a clear reason. · Lose your sense of taste or smell. · Be dizzy, lightheaded, or unsteady. It may be hard to stand or walk. How is a closed head injury treated? Any person who may have a concussion needs to see a doctor. Some people have to stay in the hospital to be watched. Others can go home safely. If you go home, follow your doctor's instructions. He or she will tell you if you need someone to watch you closely for the next 24 hours or longer. Rest is the best treatment. Get plenty of sleep at night. And try to rest during the day. · Avoid activities that are physically or mentally demanding. These include housework, exercise, and schoolwork. And don't play video games, send text messages, or use the computer. You may need to change your school or work schedule to be able to avoid these activities.   · Ask your doctor when it's okay to drive, ride a bike, or operate machinery. · Take an over-the-counter pain medicine, such as acetaminophen (Tylenol), ibuprofen (Advil, Motrin), or naproxen (Aleve). Be safe with medicines. Read and follow all instructions on the label. · Check with your doctor before you use any other medicines for pain. · Do not drink alcohol or use illegal drugs. They can slow recovery. They can also increase your risk of getting a second head injury. Follow-up care is a key part of your treatment and safety. Be sure to make and go to all appointments, and call your doctor if you are having problems. It's also a good idea to know your test results and keep a list of the medicines you take. Where can you learn more? Go to http://maite-miguel.info/. Enter E235 in the search box to learn more about \"Learning About a Closed Head Injury. \"  Current as of: June 4, 2018  Content Version: 11.8  © 3681-3757 [employer]. Care instructions adapted under license by [employer] (which disclaims liability or warranty for this information). If you have questions about a medical condition or this instruction, always ask your healthcare professional. [place] [address] any warranty or liability for your use of this information. Cuts Closed With Stitches: Care Instructions  Your Care Instructions  A cut can happen anywhere on your body. The doctor used stitches to close the cut. Using stitches also helps the cut heal and reduces scarring. Sometimes pieces of tape called Steri-Strips are put over the stitches. If the cut went deep and through the skin, the doctor may have put in two layers of stitches. The deeper layer brings the deep part of the cut together. These stitches will dissolve and don't need to be removed. The stitches in the upper layer are the ones you see on the cut. You will probably have a bandage over the stitches.  You will need to have the stitches removed, usually in 7 to 14 days. The doctor has checked you carefully, but problems can develop later. If you notice any problems or new symptoms, get medical treatment right away. Follow-up care is a key part of your treatment and safety. Be sure to make and go to all appointments, and call your doctor if you are having problems. It's also a good idea to know your test results and keep a list of the medicines you take. How can you care for yourself at home? · Keep the cut dry for the first 24 to 48 hours. After this, you can shower if your doctor okays it. Pat the cut dry. · Don't soak the cut, such as in a bathtub. Your doctor will tell you when it's safe to get the cut wet. · If your doctor told you how to care for your cut, follow your doctor's instructions. If you did not get instructions, follow this general advice:  ? After the first 24 to 48 hours, wash around the cut with clean water 2 times a day. Don't use hydrogen peroxide or alcohol, which can slow healing. ? You may cover the cut with a thin layer of petroleum jelly, such as Vaseline, and a nonstick bandage. ? Apply more petroleum jelly and replace the bandage as needed. · Prop up the sore area on a pillow anytime you sit or lie down during the next 3 days. Try to keep it above the level of your heart. This will help reduce swelling. · Avoid any activity that could cause your cut to reopen. · Do not remove the stitches on your own. Your doctor will tell you when to come back to have the stitches removed. · Leave Steri-Strips on until they fall off. · Be safe with medicines. Read and follow all instructions on the label. ? If the doctor gave you a prescription medicine for pain, take it as prescribed. ? If you are not taking a prescription pain medicine, ask your doctor if you can take an over-the-counter medicine. When should you call for help? Call your doctor now or seek immediate medical care if:    · You have new pain, or your pain gets worse.   · The skin near the cut is cold or pale or changes color.     · You have tingling, weakness, or numbness near the cut.     · The cut starts to bleed, and blood soaks through the bandage. Oozing small amounts of blood is normal.     · You have trouble moving the area near the cut.     · You have symptoms of infection, such as:  ? Increased pain, swelling, warmth, or redness around the cut.  ? Red streaks leading from the cut.  ? Pus draining from the cut.  ? A fever.    Watch closely for changes in your health, and be sure to contact your doctor if:    · The cut reopens.     · You do not get better as expected. Where can you learn more? Go to http://maite-miguel.info/. Enter R217 in the search box to learn more about \"Cuts Closed With Stitches: Care Instructions. \"  Current as of: November 20, 2017  Content Version: 11.8  © 3647-8058 [employer]. Care instructions adapted under license by [employer] (which disclaims liability or warranty for this information). If you have questions about a medical condition or this instruction, always ask your healthcare professional. [address] any warranty or liability for your use of this information. We hope that we have addressed all of your medical concerns. The examination and treatment you received in the Emergency Department were for an emergent problem and were not intended as complete care. It is important that you follow up with your healthcare provider(s) for ongoing care. If your symptoms worsen or do not improve as expected, and you are unable to reach your usual health care provider(s), you should return to the Emergency Department. Today's healthcare is undergoing tremendous change, and patient satisfaction surveys are one of the many tools to assess the quality of medical care.   You may receive a survey from the [employer] regarding your experience in the Emergency Department. I hope that your experience has been completely positive, particularly the medical care that I provided. As such, please participate in the survey; anything less than excellent does not meet my expectations or intentions. [facility] and [facility] participate in nationally recognized quality of care measures. If your blood pressure is greater than 120/80, as reported below, we urge that you seek medical care to address the potential of high blood pressure, commonly known as hypertension. Hypertension can be hereditary or can be caused by certain medical conditions, pain, stress, or \"white coat syndrome. \"       Please make an appointment with your health care provider(s) for follow up of your Emergency Department visit. VITALS:   Patient Vitals for the past 8 hrs:   Temp Pulse Resp BP SpO2   12/11/18 1545 -- -- -- -- 97 %   12/11/18 1516 97.7 °F (36.5 °C) 69 20 121/79 97 %          Thank you for allowing us to provide you with medical care today. We realize that you have many choices for your emergency care needs. Please choose us in the future for any continued health care needs. Odette Sanchez, [address] [employer].   Office: [phone]            Recent Results (from the past 24 hour(s))   AMB PT/INR EXTERNAL    Collection Time: 12/11/18 12:00 AM   Result Value Ref Range    INR, External 2.6 2.0 - 3.0   AMB POC PT/INR    Collection Time: 12/11/18  3:19 PM   Result Value Ref Range    VALID INTERNAL CONTROL POC Yes     Prothrombin time (POC) 30.9 seconds    INR POC 2.6    POC INR    Collection Time: 12/11/18  3:26 PM   Result Value Ref Range    INR (POC) 2.4 (H) <1.2         Ct Head Wo Cont    Result Date: 12/11/2018  EXAM: CT HEAD WO CONT INDICATION: fall closed head injury on coumadin COMPARISON: None. CONTRAST: None. TECHNIQUE: Unenhanced CT of the head was performed using 5 mm images. Brain and bone windows were generated. CT dose reduction was achieved through use of a standardized protocol tailored for this examination and automatic exposure control for dose modulation. EXAM: Brain CT without contrast. INDICATION: fall closed head injury on coumadin TECHNIQUE: Noncontrast CT of the brain is performed with 5 mm collimation. Bone algorithm axial and sagittal and coronal reconstructions performed and evaluated. CT dose reduction was achieved through use of a standardized protocol tailored for this examination and automatic exposure control for dose modulation. COMPARISON: None FINDINGS: There is no acute intracranial hemorrhage, mass, mass effect or herniation. Ventricles and sulci show expected proportionate and symmetric pattern. There is mild periventricular white matter hypodensity. The gray-white matter differentiation is well-preserved. Atherosclerotic calcifications are seen within the cavernous carotids. The mastoid air cells are well pneumatized. The visualized paranasal sinuses are normal.     IMPRESSION: No acute intracranial hemorrhage, mass or infarct. Nonspecific white matter change most compatible with small vessel ischemic and/or senescent change. Intracranial atherosclerosis.

## 2018-12-11 NOTE — ED TRIAGE NOTES
Triage: Slipped on the ice this morning around 0900, denies LOC. Was able to get back up with help per patient. Takes Coumadin has dried blood on the right side of his head. INR was checked at the Cardiologist office this morning 2.6.   Tetanus up to date

## 2018-12-11 NOTE — ED PROVIDER NOTES
80 y.o. male with past medical history significant for a-fib, mixed hyperlipidemia, ELIANE, paresthesia, osteopenia, and skin cancer who presents ambulatory from home accompanied by spouse with chief complaint of fall. Patient reports slipping on dry ice at 0900 this morning and hitting the back of his head. He denies LOC and states that he was able to get up after the fall. Patient was evaluated by his cardiologist this morning, and his INR was 2.6. Of note, patient takes Coumadin. Pertinent negatives include neck pain and hip pain. There are no other acute medical concerns at this time. Social hx: Denies tobacco use; denies alcohol use; denies illicit drug use    Note written by Harry Summers, as dictated by Isabell Glaser MD 3:25 PM                   Past Medical History:   Diagnosis Date    Atrial fibrillation ([address]     Decreased hearing 8/28/2013    Disorder of skin 8/28/2013    GI AVM (gastrointestinal arteriovenous vascular malformation) 2005    Dr. Lion Patino Long-term (current) use of anticoagulants 8/28/2013    Mitral valve disorders(424.0)     Mixed hyperlipidemia     Nonspecific (abnormal) findings on radiological and other examination of other intrathoracic organs 8/28/2013    Lung    Orthostatic hypotension     ELIANE (obstructive sleep apnea) 2010    Dr. Valeri Montes Osteopenia 8/28/2013    Paresthesia 8/28/2013    Personal history of malignant melanoma of skin 8/28/2013    Skin lesion 8/28/2013       History reviewed. No pertinent surgical history. History reviewed. No pertinent family history.     Social History     Socioeconomic History    Marital status:      Spouse name: Not on file    Number of children: Not on file    Years of education: Not on file    Highest education level: Not on file   Social Needs    Financial resource strain: Not on file    Food insecurity - worry: Not on file    Food insecurity - inability: Not on file   Social Rewards needs - medical: Not on file    Transportation needs - non-medical: Not on file   Occupational History    Not on file   Tobacco Use    Smoking status: Never Smoker    Smokeless tobacco: Never Used   Substance and Sexual Activity    Alcohol use: No    Drug use: No    Sexual activity: Not on file   Other Topics Concern    Not on file   Social History Narrative    Not on file         ALLERGIES: Cortisone and Plavix [clopidogrel]    Review of Systems   Constitutional: Negative for chills and fever. Respiratory: Negative for cough and shortness of breath. Cardiovascular: Negative for chest pain. Gastrointestinal: Negative for nausea and vomiting. Musculoskeletal: Negative for arthralgias, neck pain and neck stiffness. Skin: Positive for wound. Neurological: Negative for syncope, weakness and numbness. All other systems reviewed and are negative.       Vitals:    12/11/18 1516   BP: 121/79   Pulse: 69   Resp: 20   Temp: 97.7 °F (36.5 °C)   SpO2: 97%   Weight: 74.4 kg (164 lb)   Height: 5' 11\" (1.803 m)            Physical Exam   Physical Examination: General appearance - alert, well appearing, and in no distress, oriented to person, place, and time and normal appearing weight  HEENT-1cm laceration to right parietal scalp, no active bleeding  Eyes - pupils equal and reactive, extraocular eye movements intact  Neck - supple, no significant adenopathy, no midline c-spine tenderness or pain with rom  Chest - clear to auscultation, no wheezes, rales or rhonchi, symmetric air entry  Heart - normal rate, regular rhythm, normal S1, S2, no murmurs, rubs, clicks or gallops  Abdomen - soft, nontender, nondistended, no masses or organomegaly  Back exam - full range of motion, no tenderness, palpable spasm or pain on motion  Neurological - alert, oriented, normal speech, no focal findings or movement disorder noted  Musculoskeletal - no joint tenderness, deformity or swelling  Extremities - peripheral pulses normal, no pedal edema, no clubbing or cyanosis  Skin - normal coloration and turgor, no rashes, no suspicious skin lesions noted  MDM  Number of Diagnoses or Management Options  Closed head injury, initial encounter:   Laceration of scalp, initial encounter:      Amount and/or Complexity of Data Reviewed  Clinical lab tests: ordered and reviewed  Tests in the radiology section of CPT®: ordered and reviewed  Obtain history from someone other than the patient: yes (wife)  Independent visualization of images, tracings, or specimens: yes    Patient Progress  Patient progress: improved         WOUND REPAIR  Date/Time: 12/11/2018 5:17 PM  Performed by: attendingPreparation: skin prepped with Betadine and sterile field established  Pre-procedure re-eval: Immediately prior to the procedure, the patient was reevaluated and found suitable for the planned procedure and any planned medications. Time out: Immediately prior to the procedure a time out was called to verify the correct patient, procedure, equipment, staff and marking as appropriate. .  Location details: scalp  Wound length:2.5 cm or less  Anesthesia: local infiltration    Anesthesia:  Local Anesthetic: lidocaine 1% with epinephrine  Anesthetic total: 0.5 mL  Foreign bodies: no foreign bodies  Irrigation solution: saline  Irrigation method: syringe  Debridement: none  Skin closure: Vicryl  Number of sutures: 2  Technique: simple and interrupted  Approximation: close  Patient tolerance: Patient tolerated the procedure well with no immediate complications  My total time at bedside, performing this procedure was 1-15 minutes. Ct head WNL, normal neuro exam. Will d/c with pcp f/u. No hip pain.

## 2019-01-13 DIAGNOSIS — I05.9 MITRAL VALVE DISORDER: ICD-10-CM

## 2019-01-13 DIAGNOSIS — I48.20 CHRONIC ATRIAL FIBRILLATION (HCC): ICD-10-CM

## 2019-01-13 RX ORDER — WARFARIN SODIUM 5 MG/1
TABLET ORAL
Qty: 102 TAB | Refills: 1 | OUTPATIENT
Start: 2019-01-13